# Patient Record
Sex: FEMALE | Race: WHITE | Employment: OTHER | URBAN - METROPOLITAN AREA
[De-identification: names, ages, dates, MRNs, and addresses within clinical notes are randomized per-mention and may not be internally consistent; named-entity substitution may affect disease eponyms.]

---

## 2017-03-03 ENCOUNTER — HOSPITAL ENCOUNTER (EMERGENCY)
Facility: CLINIC | Age: 47
Discharge: HOME | End: 2017-03-03
Attending: EMERGENCY MEDICINE | Admitting: EMERGENCY MEDICINE
Payer: MEDICARE

## 2017-03-03 ENCOUNTER — APPOINTMENT (OUTPATIENT)
Dept: CT IMAGING | Facility: CLINIC | Age: 47
End: 2017-03-03
Attending: EMERGENCY MEDICINE
Payer: MEDICARE

## 2017-03-03 VITALS
HEIGHT: 66 IN | BODY MASS INDEX: 21.53 KG/M2 | SYSTOLIC BLOOD PRESSURE: 120 MMHG | DIASTOLIC BLOOD PRESSURE: 84 MMHG | OXYGEN SATURATION: 96 % | WEIGHT: 134 LBS | RESPIRATION RATE: 19 BRPM | TEMPERATURE: 98.6 F

## 2017-03-03 DIAGNOSIS — R10.84 ABDOMINAL PAIN, GENERALIZED: ICD-10-CM

## 2017-03-03 LAB
ALBUMIN SERPL-MCNC: 3.9 G/DL (ref 3.4–5)
ALP SERPL-CCNC: 49 U/L (ref 40–150)
ALT SERPL W P-5'-P-CCNC: 20 U/L (ref 0–50)
ANION GAP SERPL CALCULATED.3IONS-SCNC: 8 MMOL/L (ref 3–14)
AST SERPL W P-5'-P-CCNC: 15 U/L (ref 0–45)
BILIRUB SERPL-MCNC: 0.4 MG/DL (ref 0.2–1.3)
BUN SERPL-MCNC: 9 MG/DL (ref 7–30)
CALCIUM SERPL-MCNC: 9 MG/DL (ref 8.5–10.1)
CHLORIDE SERPL-SCNC: 106 MMOL/L (ref 94–109)
CO2 SERPL-SCNC: 28 MMOL/L (ref 20–32)
CREAT SERPL-MCNC: 0.76 MG/DL (ref 0.52–1.04)
ERYTHROCYTE [DISTWIDTH] IN BLOOD BY AUTOMATED COUNT: 13.6 % (ref 10–15)
GFR SERPL CREATININE-BSD FRML MDRD: 82 ML/MIN/1.7M2
GLUCOSE SERPL-MCNC: 90 MG/DL (ref 70–99)
HCT VFR BLD AUTO: 42.1 % (ref 35–47)
HGB BLD-MCNC: 14 G/DL (ref 11.7–15.7)
LIPASE SERPL-CCNC: 220 U/L (ref 73–393)
MCH RBC QN AUTO: 28.4 PG (ref 26.5–33)
MCHC RBC AUTO-ENTMCNC: 33.3 G/DL (ref 31.5–36.5)
MCV RBC AUTO: 85 FL (ref 78–100)
PLATELET # BLD AUTO: 271 10E9/L (ref 150–450)
POTASSIUM SERPL-SCNC: 3.7 MMOL/L (ref 3.4–5.3)
PROT SERPL-MCNC: 6.9 G/DL (ref 6.8–8.8)
RBC # BLD AUTO: 4.93 10E12/L (ref 3.8–5.2)
SODIUM SERPL-SCNC: 142 MMOL/L (ref 133–144)
WBC # BLD AUTO: 5.6 10E9/L (ref 4–11)

## 2017-03-03 PROCEDURE — 83690 ASSAY OF LIPASE: CPT | Performed by: EMERGENCY MEDICINE

## 2017-03-03 PROCEDURE — 96361 HYDRATE IV INFUSION ADD-ON: CPT

## 2017-03-03 PROCEDURE — 25000132 ZZH RX MED GY IP 250 OP 250 PS 637: Performed by: EMERGENCY MEDICINE

## 2017-03-03 PROCEDURE — 80053 COMPREHEN METABOLIC PANEL: CPT | Performed by: EMERGENCY MEDICINE

## 2017-03-03 PROCEDURE — 74177 CT ABD & PELVIS W/CONTRAST: CPT

## 2017-03-03 PROCEDURE — 96374 THER/PROPH/DIAG INJ IV PUSH: CPT | Mod: 59

## 2017-03-03 PROCEDURE — 25000125 ZZHC RX 250: Performed by: EMERGENCY MEDICINE

## 2017-03-03 PROCEDURE — 96375 TX/PRO/DX INJ NEW DRUG ADDON: CPT

## 2017-03-03 PROCEDURE — 25500064 ZZH RX 255 OP 636: Performed by: EMERGENCY MEDICINE

## 2017-03-03 PROCEDURE — 25000128 H RX IP 250 OP 636: Performed by: EMERGENCY MEDICINE

## 2017-03-03 PROCEDURE — 99285 EMERGENCY DEPT VISIT HI MDM: CPT | Mod: 25

## 2017-03-03 PROCEDURE — 85027 COMPLETE CBC AUTOMATED: CPT | Performed by: EMERGENCY MEDICINE

## 2017-03-03 RX ORDER — ONDANSETRON 2 MG/ML
4 INJECTION INTRAMUSCULAR; INTRAVENOUS ONCE
Status: COMPLETED | OUTPATIENT
Start: 2017-03-03 | End: 2017-03-03

## 2017-03-03 RX ORDER — KETOROLAC TROMETHAMINE 15 MG/ML
15 INJECTION, SOLUTION INTRAMUSCULAR; INTRAVENOUS ONCE
Status: COMPLETED | OUTPATIENT
Start: 2017-03-03 | End: 2017-03-03

## 2017-03-03 RX ORDER — IOPAMIDOL 755 MG/ML
68 INJECTION, SOLUTION INTRAVASCULAR ONCE
Status: COMPLETED | OUTPATIENT
Start: 2017-03-03 | End: 2017-03-03

## 2017-03-03 RX ADMIN — SODIUM CHLORIDE 1000 ML: 9 INJECTION, SOLUTION INTRAVENOUS at 12:46

## 2017-03-03 RX ADMIN — SODIUM CHLORIDE 60 ML: 9 INJECTION, SOLUTION INTRAVENOUS at 14:38

## 2017-03-03 RX ADMIN — ONDANSETRON HYDROCHLORIDE 4 MG: 2 SOLUTION INTRAMUSCULAR; INTRAVENOUS at 12:47

## 2017-03-03 RX ADMIN — DOCUSATE SODIUM 286 ML: 50 LIQUID ORAL at 17:07

## 2017-03-03 RX ADMIN — KETOROLAC TROMETHAMINE 15 MG: 15 INJECTION, SOLUTION INTRAMUSCULAR; INTRAVENOUS at 14:09

## 2017-03-03 RX ADMIN — IOPAMIDOL 68 ML: 755 INJECTION, SOLUTION INTRAVENOUS at 14:38

## 2017-03-03 ASSESSMENT — ENCOUNTER SYMPTOMS
HEMATURIA: 0
NAUSEA: 1
ABDOMINAL DISTENTION: 1
CONSTIPATION: 1
ABDOMINAL PAIN: 1
FREQUENCY: 0
DYSURIA: 0

## 2017-03-03 NOTE — ED PROVIDER NOTES
"  History     Chief Complaint:  Abdominal Pain    HPI   Keyla Aragon is a 47 year old female with hx of RA who presents to the emergency department today for evaluation of abdominal pain. Pain has been ongoing for several weeks.The patient had a negative Cdiff test on 17. Of note, 15 days ago her CT scan at [facility] was read as normal but told that it was read abnormally and actually has stool impaction. Yesterday she reports having a abdominal x-ray that revealed she was \"full of stool.\" Her PCP then recommended the patient come into the ED today. The patient notes with the abdominal pain she is having constipation, only infrequent watery stools, and nausea. No vomiting but has been taking zofran.  She had been getting shots of Cosentyx with her 4th and most recent shot coming on 1/10/17. Since this shot, she has noticed irregular bowel movements. The patient has tried Imodium, Miralax, laxatives, and enemas for her constipation. She then has only had the watery stools and subsequent nausea for which she has used Zofran. Her pain is diffuse in the abdomen with some bloating but no dysuria, hematuria, frequency, or urgency. The patient has no history of bowel obstructions. She has a history of an appendectomy, cholecystectomy, hysterectomy, and .     Allergies:  Penicillins  Ceftin  Compazine  Phenergan  Reglan     Medications:    Ativan  Clonidine  Methotrexate  Miralax  Rituxan  Zofran  Folvite    Past Medical History:    Ankylosing spondylitis  Arthritis  Cholecystitis  Heart murmur  PONV  Spinal headaches     Past Surgical History:    C section  Discectomy lumbar posterior  Cholecystectomy  Appendectomy  Hysterectomy     Family History:    History reviewed. No pertinent family history.      Social History:  The patient was accompanied to the ED by .  Smoking Status: never smoker  Alcohol Use: negative   Marital Status:   [2]    Review of Systems   Gastrointestinal: Positive " "for abdominal distention, abdominal pain, constipation and nausea.   Genitourinary: Negative for dysuria, frequency, hematuria and urgency.   All other systems reviewed and are negative.    Physical Exam   Vitals:  Patient Vitals for the past 24 hrs:   BP Temp Temp src Heart Rate Resp SpO2 Height Weight   03/03/17 1600 123/77 - - - - - - -   03/03/17 1445 114/81 - - - - - - -   03/03/17 1026 133/80 98.6  F (37  C) Oral 83 19 98 % 1.676 m (5' 6\") 60.8 kg (134 lb)       Physical Exam    General: Resting comfortably on the gurney  Eyes:  The pupils are equal and round  ENT:    Moist mucous membranes    The oropharynx is clear  Neck:  Normal range of motion  CV:  Regular rate and rhythm    Skin warm and well perfused   Resp:  Lungs are clear    Non-labored    No rales    No wheezing   GI:  Abdomen is soft, there is no rigidity    No distension    No rebound tenderness     Minimal diffuse abdominal tenderness  MS:  Normal muscular tone  Skin:  No rash or acute skin lesions noted  Neuro:   Awake, alert.      Speech is normal and fluent.    Face is symmetric.     Moves all extremities  Psych:  Normal affect.  Appropriate interactions.     Emergency Department Course     Imaging:  Radiology findings were communicated with the patient who voiced understanding of the findings.    CT Abdomen Pelvis w Contrast   IMPRESSION: No acute changes in the abdomen or pelvis to account for   patient's symptoms. No bowel obstruction or diverticulitis. Appendix   not visualized. Abdominal and pelvic organs are within normal limits.   Prior cholecystectomy.      JOSE ALFREDO HUTCHISON MD     Laboratory:  Laboratory findings were communicated with the patient who voiced understanding of the findings.    CBC: WBC 5.6, HGB 14.0,   CMP: AWNL (Creatinine: 0.76)  Lipase: 220     Interventions:  1246 NS 1000 ml IV  1247 Zofran 4 mg IV  1324 Omnipaque 25 ml oral   1409 Toradol 15 mg IV   1707 Pink Lady Enema 286 ml Rectal      Emergency Department " Course:  Nursing notes and vitals reviewed.  I performed an exam of the patient as documented above.   IV was inserted and blood was drawn for laboratory testing, results above.  The patient was sent for imaging per above while in the emergency department, results above.    At 1620 the patient was rechecked on and was updated on the results of her laboratory and imaging studies.    1945 Patient re-evaluated  I discussed the treatment plan with the patient. They expressed understanding of this plan and consented to discharge. They will be discharged home with instructions for care and follow up. In addition, the patient will return to the emergency department if their symptoms persist, worsen, if new symptoms arise or if there is any concern.  All questions were answered.   I personally reviewed the laboratory and imaging results with the patient and answered all related questions prior to discharge.    Impression & Plan      Medical Decision Making:  Keyla Aragon is a 47 year old female who presents to the emergency department with abdominal pain. Vital signs are normal. She has an unremarkable physical exam and minimal abdominal tenderness on exam. She is concerned about a bowel obstruction. Laboratory values obtained and CBC, CMP, and lipase are normal. She has had a hysterectomy before. Discussed with her about her symptoms today as well as laboratory and CT findings on previous visits. CT abdomen performed today and unremarkable. Unable to visualize appendix but patient has had previous appendectomy. She already has an appointment with [facility] for pain and has a gastric emptying study in a few days. No evidence of dehydration on evaluation or laboratory values. Patient was given pink lady enema for constipation and recommended bowel regime at home. Recommend follow up with [facility] and the gastric emptying study. No evidence of infection on CT and she recently had a negative Cdiff study. Wondering if this could be  gastroparesis versus food allergy versus constipation versus chronic pain after being on narcotics for many years. Has appropriate follow-up and no indication for hospitalization. Reasons to return to the ED were discussed with the patient. Recommended follow up with appointments she already has scheduled.     Diagnosis:    ICD-10-CM    1. Abdominal pain, generalized R10.84      Disposition:   Discharge    Scribe Disclosure:  INato, am serving as a scribe at 11:26 AM on 3/3/2017 to document services personally performed by Maria T Malave MD, based on my observations and the provider's statements to me.   3/3/2017    EMERGENCY DEPARTMENT       Maria T Malave MD  03/03/17 1816

## 2017-03-03 NOTE — ED AVS SNAPSHOT
Emergency Department    [address] [facility] [address]    Phone:  [phone]    Fax:  [phone]                                       Keyla Aragon   MRN: [id number]    Department:   Emergency Department   Date of Visit:  3/3/2017           After Visit Summary Signature Page     I have received my discharge instructions, and my questions have been answered. I have discussed any challenges I see with this plan with the nurse or doctor.    ..........................................................................................................................................  Patient/Patient Representative Signature      ..........................................................................................................................................  Patient Representative Print Name and Relationship to Patient    ..................................................               ................................................  Date                                            Time    ..........................................................................................................................................  Reviewed by Signature/Title    ...................................................              ..............................................  Date                                                            Time

## 2017-03-03 NOTE — ED AVS SNAPSHOT
Emergency Department    [address] [facility] [address]    Phone:  [phone]    Fax:  [phone]                                       Keyla Aragon   MRN: [id number]    Department:   Emergency Department   Date of Visit:  3/3/2017           Patient Information     Date Of Birth          1970        Your diagnoses for this visit were:     Abdominal pain, generalized        You were seen by Maria T Malave MD.      Follow-up Information     Follow up with Danilo Sánchez MD.    Specialty:  Family Practice    Contact information:    [facility]  [address] [facility] [address]  [facility] [address]  [phone]          Follow up with  Emergency Department.    Specialty:  EMERGENCY MEDICINE    Why:  If symptoms worsen    Contact information:    [address] [facility] [address] [phone]        Discharge Instructions       Follow up with [facility] next week  Go to your gastric emptying study on Monday  Use miralax twice per day. Can also do docusate twice per day, senna twice per day until having soft bowel movements daily then can back off     Discharge Instructions  Abdominal Pain    Abdominal pain can be caused by many things. Your evaluation today does not show the exact cause for your pain. Your doctor today has decided that it is unlikely your pain is due to a life threatening problem, or a problem requiring surgery or hospital admission. Sometimes those problems cannot be found right away, so it is very important that you follow up as directed.  Sometimes only the changes which occur over time allow the cause of your pain to be found.    Return to the Emergency Department for a recheck in 8-12 hours if your pain continues.  If your pain gets worse, changes in location, or feels different, return to the Emergency Department right away.    ADULTS:  Return to the Emergency Department right away if:      You get an oral temperature above 102oF or as  directed by your doctor.    You have blood in your stools (bright red or black, tarry stools).    You keep throwing up or can t drink liquids.    You see blood when you throw up.    You can t have a bowel movement or you can t pass gas.    Your stomach gets bloated or bigger.    Your skin or the whites of your eyes look yellow.    You faint.    You have bloody, frequent or painful urination.    You have new symptoms or anything that worries you.    CHILDREN:  Return to the Emergency Department right away if your child has any of the above-listed symptoms or the following:      Pushes your hand away or screams/cries when his/her belly is touched.    You notice your child is very fussy or weak.    Your child is very tired and is too tired to eat or drink.    Your child is dehydrated.  Signs of dehydration can be:  o Your infant has had no wet diapers in 4-5 hours.  o Your older child has not passed urine in 6-8 hours.  o Your infant or child starts to have dry mouth and lips, or no saliva or tears.    PREGNANT WOMEN:  Return to the Emergency Department right away if you have any of the above-listed symptoms or the following:      You have bleeding, leaking fluid or passing tissue from the vagina.    You have worse pain or cramping, or pain in your shoulder or back.    You have vomiting that will not stop.    You have painful or bloody urination.    You have a temperature of 100oF or more.    Your baby is not moving as much as usual.    You faint.    You get a bad headache with or without eye problems and abdominal pain.    You have a convulsion or seizure.    You have unusual discharge from your vagina and abdominal pain.    Abdominal pain is pretty common during pregnancy.  Your pain may or may not be related to your pregnancy. You should follow-up closely with your OB doctor so they can evaluate you and your baby.  Until you follow-up with your regular doctor, do the following:       Avoid sex and do not put  "anything in your vagina.    Drink clear fluids.    Only take medications approved by your doctor.    MORE INFORMATION:    Appendicitis:  A possible cause of abdominal pain in any person who still has their appendix is acute appendicitis. Appendicitis is often hard to diagnose.  Testing does not always rule out early appendicitis or other causes of abdominal pain. Close follow-up with your doctor and re-evaluations may be needed to figure out the reason for your abdominal pain.    Follow-up:  It is very important that you make an appointment with your clinic and go to the appointment.  If you do not follow-up with your primary doctor, it may result in missing an important development which could result in permanent injury or disability and/or lasting pain.  If there is any problem keeping your appointment, call your doctor or return to the Emergency Department.    Medications:  Take your medications as directed by your doctor today.  Before using over-the-counter medications, ask your doctor and make sure to take the medications as directed.  If you have any questions about medications, ask your doctor.    Diet:  Resume your normal diet as much as possible, but do not eat fried, fatty or spicy foods while you have pain.  Do not drink alcohol or have caffeine.  Do not smoke tobacco.    Probiotics: If you have been given an antibiotic, you may want to also take a probiotic pill or eat yogurt with live cultures. Probiotics have \"good bacteria\" to help your intestines stay healthy. Studies have shown that probiotics help prevent diarrhea and other intestine problems (including C. diff infection) when you take antibiotics. You can buy these without a prescription in the pharmacy section of the store.     If you were given a prescription for medicine here today, be sure to read all of the information (including the package insert) that comes with your prescription.  This will include important information about the " medicine, its side effects, and any warnings that you need to know about.  The pharmacist who fills the prescription can provide more information and answer questions you may have about the medicine.  If you have questions or concerns that the pharmacist cannot address, please call or return to the Emergency Department.     Remember that you can always come back to the Emergency Department if you are not able to see your regular doctor in the amount of time listed above, if you get any new symptoms, or if there is anything that worries you.          24 Hour Appointment Hotline       To make an appointment at any [facility], call [phone] ([phone]). If you don't have a family doctor or clinic, we will help you find one. [facility] clinics are conveniently located to serve the needs of you and your family.             Review of your medicines      Our records show that you are taking the medicines listed below. If these are incorrect, please call your family doctor or clinic.        Dose / Directions Last dose taken    ATIVAN PO   Dose:  0.5 mg        Take 0.5 mg by mouth   Refills:  0        CLONIDINE HCL PO        Refills:  0        folic acid 1 MG tablet   Commonly known as:  FOLVITE   Dose:  1 mg        Take 1 mg by mouth daily.   Refills:  0        METHOTREXATE PO        Refills:  0        NONFORMULARY        Refills:  0        polyethylene glycol powder   Commonly known as:  MIRALAX/GLYCOLAX   Dose:  1 capful        Take 1 capful by mouth daily   Refills:  0        RITUXAN IV        Inject  into the vein every 6 months.   Refills:  0        ZOFRAN PO   Dose:  8 mg        Take 8 mg by mouth   Refills:  0                Procedures and tests performed during your visit     CBC (+ platelets, no diff)    CT Abdomen Pelvis w Contrast    Comprehensive metabolic panel    Lipase      Orders Needing Specimen Collection     None      Pending Results     No orders found from 3/1/2017 to 3/4/2017.             Pending Culture Results     No orders found from 3/1/2017 to 3/4/2017.             Test Results from your hospital stay     3/3/2017  1:04 PM - Interface, Flexilab Results      Component Results     Component Value Ref Range & Units Status    Sodium 142 133 - 144 mmol/L Final    Potassium 3.7 3.4 - 5.3 mmol/L Final    Chloride 106 94 - 109 mmol/L Final    Carbon Dioxide 28 20 - 32 mmol/L Final    Anion Gap 8 3 - 14 mmol/L Final    Glucose 90 70 - 99 mg/dL Final    Urea Nitrogen 9 7 - 30 mg/dL Final    Creatinine 0.76 0.52 - 1.04 mg/dL Final    GFR Estimate 82 >60 mL/min/1.7m2 Final    Non  GFR Calc    GFR Estimate If Black >90   GFR Calc   >60 mL/min/1.7m2 Final    Calcium 9.0 8.5 - 10.1 mg/dL Final    Bilirubin Total 0.4 0.2 - 1.3 mg/dL Final    Albumin 3.9 3.4 - 5.0 g/dL Final    Protein Total 6.9 6.8 - 8.8 g/dL Final    Alkaline Phosphatase 49 40 - 150 U/L Final    ALT 20 0 - 50 U/L Final    AST 15 0 - 45 U/L Final         3/3/2017 12:47 PM - Interface, Flexilab Results      Component Results     Component Value Ref Range & Units Status    WBC 5.6 4.0 - 11.0 10e9/L Final    RBC Count 4.93 3.8 - 5.2 10e12/L Final    Hemoglobin 14.0 11.7 - 15.7 g/dL Final    Hematocrit 42.1 35.0 - 47.0 % Final    MCV 85 78 - 100 fl Final    MCH 28.4 26.5 - 33.0 pg Final    MCHC 33.3 31.5 - 36.5 g/dL Final    RDW 13.6 10.0 - 15.0 % Final    Platelet Count 271 150 - 450 10e9/L Final         3/3/2017  1:02 PM - Interface, Flexilab Results      Component Results     Component Value Ref Range & Units Status    Lipase 220 73 - 393 U/L Final         3/3/2017  3:06 PM - Interface, Radiant Ib      Narrative     CT ABDOMEN AND PELVIS WITH CONTRAST 3/3/2017 2:40 PM     HISTORY: Diffuse abdominal pain.     TECHNIQUE: Axial images from the lung bases to the symphysis are  performed with additional coronal reformatted images. 68 mL of Isovue  370 are given intravenously.  Radiation dose for this scan was  reduced  using automated exposure control, adjustment of the mA and/or kV  according to patient size, or iterative reconstruction technique. Oral  contrast is also given.    FINDINGS: The lung bases are clear.    Abdomen: Gallbladder is not identified and is likely surgically  absent. The liver, spleen, pancreas, adrenal glands and kidneys are  unremarkable. No hydronephrosis. No evidence of urinary tract calculi.  The bowel is unremarkable. No obstruction or diverticulitis. Appendix  is not identified. No enlarged abdominal lymph nodes.    Pelvis: The bladder and rectum are unremarkable. No enlarged pelvic  lymph nodes or free fluid. Bone window examination is unremarkable. No  aggressive- appearing bone lesions are evident.        Impression     IMPRESSION: No acute changes in the abdomen or pelvis to account for  patient's symptoms. No bowel obstruction or diverticulitis. Appendix  not visualized. Abdominal and pelvic organs are within normal limits.  Prior cholecystectomy.    JOSE ALFREDO HUTCHISON MD                Clinical Quality Measure: Blood Pressure Screening     Your blood pressure was checked while you were in the emergency department today. The last reading we obtained was  BP: 120/84 . Please read the guidelines below about what these numbers mean and what you should do about them.  If your systolic blood pressure (the top number) is less than 120 and your diastolic blood pressure (the bottom number) is less than 80, then your blood pressure is normal. There is nothing more that you need to do about it.  If your systolic blood pressure (the top number) is 120-139 or your diastolic blood pressure (the bottom number) is 80-89, your blood pressure may be higher than it should be. You should have your blood pressure rechecked within a year by a primary care provider.  If your systolic blood pressure (the top number) is 140 or greater or your diastolic blood pressure (the bottom number) is 90 or greater, you may have  "high blood pressure. High blood pressure is treatable, but if left untreated over time it can put you at risk for heart attack, stroke, or kidney failure. You should have your blood pressure rechecked by a primary care provider within the next 4 weeks.  If your provider in the emergency department today gave you specific instructions to follow-up with your doctor or provider even sooner than that, you should follow that instruction and not wait for up to 4 weeks for your follow-up visit.        Thank you for choosing [facility]       Thank you for choosing [facility] for your care. Our goal is always to provide you with excellent care. Hearing back from our patients is one way we can continue to improve our services. Please take a few minutes to complete the written survey that you may receive in the mail after you visit with us. Thank you!        [employer] Information     [employer] lets you send messages to your doctor, view your test results, renew your prescriptions, schedule appointments and more. To sign up, go to [email or web address] . Click on \"Log in\" on the left side of the screen, which will take you to the Welcome page. Then click on \"Sign up Now\" on the right side of the page.     You will be asked to enter the access code listed below, as well as some personal information. Please follow the directions to create your username and password.     Your access code is: [email or web address]  Expires: 2017  6:18 PM     Your access code will  in 90 days. If you need help or a new code, please call your [facility] clinic or [phone].        Care EveryWhere ID     This is your Care EveryWhere ID. This could be used by other organizations to access your [facility] medical records  [id number]        After Visit Summary       This is your record. Keep this with you and show to your community pharmacist(s) and doctor(s) at your next visit.                  "

## 2017-03-04 NOTE — DISCHARGE INSTRUCTIONS
Follow up with [facility] next week  Go to your gastric emptying study on Monday  Use miralax twice per day. Can also do docusate twice per day, senna twice per day until having soft bowel movements daily then can back off     Discharge Instructions  Abdominal Pain    Abdominal pain can be caused by many things. Your evaluation today does not show the exact cause for your pain. Your doctor today has decided that it is unlikely your pain is due to a life threatening problem, or a problem requiring surgery or hospital admission. Sometimes those problems cannot be found right away, so it is very important that you follow up as directed.  Sometimes only the changes which occur over time allow the cause of your pain to be found.    Return to the Emergency Department for a recheck in 8-12 hours if your pain continues.  If your pain gets worse, changes in location, or feels different, return to the Emergency Department right away.    ADULTS:  Return to the Emergency Department right away if:      You get an oral temperature above 102oF or as directed by your doctor.    You have blood in your stools (bright red or black, tarry stools).    You keep throwing up or can t drink liquids.    You see blood when you throw up.    You can t have a bowel movement or you can t pass gas.    Your stomach gets bloated or bigger.    Your skin or the whites of your eyes look yellow.    You faint.    You have bloody, frequent or painful urination.    You have new symptoms or anything that worries you.    CHILDREN:  Return to the Emergency Department right away if your child has any of the above-listed symptoms or the following:      Pushes your hand away or screams/cries when his/her belly is touched.    You notice your child is very fussy or weak.    Your child is very tired and is too tired to eat or drink.    Your child is dehydrated.  Signs of dehydration can be:  o Your infant has had no wet diapers in 4-5 hours.  o Your older child has not  passed urine in 6-8 hours.  o Your infant or child starts to have dry mouth and lips, or no saliva or tears.    PREGNANT WOMEN:  Return to the Emergency Department right away if you have any of the above-listed symptoms or the following:      You have bleeding, leaking fluid or passing tissue from the vagina.    You have worse pain or cramping, or pain in your shoulder or back.    You have vomiting that will not stop.    You have painful or bloody urination.    You have a temperature of 100oF or more.    Your baby is not moving as much as usual.    You faint.    You get a bad headache with or without eye problems and abdominal pain.    You have a convulsion or seizure.    You have unusual discharge from your vagina and abdominal pain.    Abdominal pain is pretty common during pregnancy.  Your pain may or may not be related to your pregnancy. You should follow-up closely with your OB doctor so they can evaluate you and your baby.  Until you follow-up with your regular doctor, do the following:       Avoid sex and do not put anything in your vagina.    Drink clear fluids.    Only take medications approved by your doctor.    MORE INFORMATION:    Appendicitis:  A possible cause of abdominal pain in any person who still has their appendix is acute appendicitis. Appendicitis is often hard to diagnose.  Testing does not always rule out early appendicitis or other causes of abdominal pain. Close follow-up with your doctor and re-evaluations may be needed to figure out the reason for your abdominal pain.    Follow-up:  It is very important that you make an appointment with your clinic and go to the appointment.  If you do not follow-up with your primary doctor, it may result in missing an important development which could result in permanent injury or disability and/or lasting pain.  If there is any problem keeping your appointment, call your doctor or return to the Emergency Department.    Medications:  Take your  "medications as directed by your doctor today.  Before using over-the-counter medications, ask your doctor and make sure to take the medications as directed.  If you have any questions about medications, ask your doctor.    Diet:  Resume your normal diet as much as possible, but do not eat fried, fatty or spicy foods while you have pain.  Do not drink alcohol or have caffeine.  Do not smoke tobacco.    Probiotics: If you have been given an antibiotic, you may want to also take a probiotic pill or eat yogurt with live cultures. Probiotics have \"good bacteria\" to help your intestines stay healthy. Studies have shown that probiotics help prevent diarrhea and other intestine problems (including C. diff infection) when you take antibiotics. You can buy these without a prescription in the pharmacy section of the store.     If you were given a prescription for medicine here today, be sure to read all of the information (including the package insert) that comes with your prescription.  This will include important information about the medicine, its side effects, and any warnings that you need to know about.  The pharmacist who fills the prescription can provide more information and answer questions you may have about the medicine.  If you have questions or concerns that the pharmacist cannot address, please call or return to the Emergency Department.     Remember that you can always come back to the Emergency Department if you are not able to see your regular doctor in the amount of time listed above, if you get any new symptoms, or if there is anything that worries you.        "

## 2018-12-17 ENCOUNTER — TELEPHONE (OUTPATIENT)
Dept: OPHTHALMOLOGY | Facility: CLINIC | Age: 48
End: 2018-12-17

## 2018-12-21 ENCOUNTER — TELEPHONE (OUTPATIENT)
Dept: OPHTHALMOLOGY | Facility: CLINIC | Age: 48
End: 2018-12-21

## 2019-01-03 ENCOUNTER — TELEPHONE (OUTPATIENT)
Dept: OPHTHALMOLOGY | Facility: CLINIC | Age: 49
End: 2019-01-03

## 2019-01-03 NOTE — TELEPHONE ENCOUNTER
FUTURE VISIT INFORMATION      FUTURE VISIT INFORMATION:    Date: 01/07/19    Time: 145pm    Location: [facility]  REFERRAL INFORMATION:    Referring provider:  YUNIOR DALE    Referring providers clinic:  [facility]/ verbal    Reason for visit/diagnosis  Drooping Eyelids on both sides.

## 2019-01-07 ENCOUNTER — OFFICE VISIT (OUTPATIENT)
Dept: OPHTHALMOLOGY | Facility: CLINIC | Age: 49
End: 2019-01-07
Payer: COMMERCIAL

## 2019-01-07 ENCOUNTER — TRANSFERRED RECORDS (OUTPATIENT)
Dept: HEALTH INFORMATION MANAGEMENT | Facility: CLINIC | Age: 49
End: 2019-01-07

## 2019-01-07 ENCOUNTER — DOCUMENTATION ONLY (OUTPATIENT)
Dept: OPHTHALMOLOGY | Facility: CLINIC | Age: 49
End: 2019-01-07

## 2019-01-07 ENCOUNTER — PRE VISIT (OUTPATIENT)
Dept: OPHTHALMOLOGY | Facility: CLINIC | Age: 49
End: 2019-01-07

## 2019-01-07 DIAGNOSIS — H57.813 BROW PTOSIS, BILATERAL: ICD-10-CM

## 2019-01-07 DIAGNOSIS — H02.401 PTOSIS OF EYELID, RIGHT: Primary | ICD-10-CM

## 2019-01-07 ASSESSMENT — TONOMETRY
OS_IOP_MMHG: 13
IOP_METHOD: ICARE
OD_IOP_MMHG: 13

## 2019-01-07 ASSESSMENT — LEVATOR FUNCTION
OS_LEVATOR: 15
OD_LEVATOR: 15

## 2019-01-07 ASSESSMENT — MARGIN REFLEX DISTANCE
OS_MRD1: 3
OD_MRD1: 2

## 2019-01-07 ASSESSMENT — VISUAL ACUITY
METHOD: SNELLEN - LINEAR
OD_SC: 20/20
OS_SC: 20/20

## 2019-01-07 ASSESSMENT — SLIT LAMP EXAM - LIDS: COMMENTS: NORMAL

## 2019-01-07 ASSESSMENT — CONF VISUAL FIELD
METHOD: COUNTING FINGERS
OS_NORMAL: 1
OD_NORMAL: 1

## 2019-01-07 NOTE — PROGRESS NOTES
Met with patient to schedule surgery with Dr. Nato Bradley..    Surgery was scheduled on 02/20 at [facility].    Patient will have H&P at [facility]  Post-Op care appointment was scheduled on 03/04  Patient is aware a / is needed day of surgery.   Patient received surgery packet has my direct contact information for any further questions.     Sara please contact patient with cosmetic quote

## 2019-01-07 NOTE — PATIENT INSTRUCTIONS
"Ptosis (Drooping Eyelids)    Eyelid ptosis (pronounced \"martir-sis\") is a condition in which the upper eyelid droops or sags. It can affect one or both eyes. Sometimes the eyelid droops enough to obstruct the upper field of vision and/or side vision, requiring correction.??Ptosis Repair is a surgical procedure that can correct drooping eyelid(s). Depending upon the degree and cause, repair involves either resection (shortening) of a muscle in the eyelid or suspension with a muscle of the brow. Typically, the levator muscle (the major muscle responsible for elevating the upper eyelid) is shortened though an incision made along the natural crease of the lid. Excess skin weighing down the eyelid may also be removed.     Congenital Ptosis  Present from birth, the most common cause of congenital ptosis is the improper development of the levator muscle. Children may need tilt their head back or lift their eyelid with a finger to see. They may also develop amblyopia (\"lazy eye\"), strabismus (eyes that are not properly aligned), astigmatism, or blurred vision. Repair for mild to moderate congenital ptosis is generally performed between ages 3 and 5. Severe visual obstruction may require earlier treatment. ??Repair is usually performed in an outpatient surgical facility under general anesthesia so the child will not become anxious or restless during the procedure.     Acquired Ptosis  Most commonly due to age-related weakening of the levator muscle, acquired ptosis may also be caused by injury, trauma, or procedures, such as cataract surgery, which can cause weak tendons to stretch. Acquired ptosis may also be the first sign of some diseases, such as myasthenia gravis (a disorder in which the muscles become weak), or Isabel's syndrome (a neurological condition that indicates injury to part of the sympathetic nervous system).? Ptosis Repair is usually performed in an outpatient surgical facility under anesthesia that induces a " "\"twilight\" state. Sedated consciousness is preferred so that Dr. Bradley can accurately adjust the eyelids.     Who Should Perform The Surgery?   When choosing a surgeon to perform ptosis surgery, look for a cosmetic and reconstructive surgeon who specializes in the eyelids, orbit, and tear drain system. Dr. Bradley's membership in the American Society of Ophthalmic Plastic and Reconstructive Surgery (ASOPRS) indicates he or she is not only a board certified ophthalmologist who knows the anatomy and structure of the eyelids and orbit, but also has had extensive training in ophthalmic plastic reconstructive and cosmetic surgery.    EYEBROW AND FOREHEAD  Eyebrow and Forehead Lifting addresses eyebrow position and loose or wrinkled forehead skin and underlying tissue. To fully understand the benefits of eyebrow and forehead lifting, one must be aware of the importance of the position of the eyebrows.  Eyebrow position changes as we age.      Our natural eyebrow position, the effects of gravity and fat deflation, how active our eyebrow and forehead muscles are, and previous eyelid, eyebrow or forehead surgeries   (if applicable), all contribute to the position our eyebrows are in today. The face sends a message, and the position of the eyebrows is a significant part of that message.   That message could be, \"I feel worried,   I feel angry,  or  I feel stressed.  The message could also be,  I feel calm, rested, and relaxed.  There are several types of eyebrow and   forehead lifts. The type you and your surgeon choose will depend on your current eyebrow position, facial structure, and on what is possible to maximize your appearance. The main types of forehead lifting are as follows:     Endoscopic Forehead Lift      The Endoscopic Eyebrow/ Forehead Lift is very popular. It requires 5 small incisions hidden in the hair and leaves no visible scar. This procedure can lift everything from the hairline to the eyebrows. It " tightens and smoothes the entire forehead while lifting the eyebrow area which  opens up  the entire upper face. Eyebrow shape and asymmetry can be addressed with this type of lift. Following the procedure there will be some bruising and swelling. Patients are usually comfortable returning to their normal routine activities in about 2 weeks.      The Pretrichial Eyebrow/ Forehead      The Pretrichial Eyebrow/ Forehead Lift requires a long incision. This procedure is ideal for patients who want to both lift the eyebrows and raise and shorten the forehead by removing a strip of skin and underlying tissue along the incision. Because the forehead is shortened, the hairline is lowered which is ideal for those with a high forehead. This procedure lifts everything from the hairline down to the eyebrows and can address eyebrow shape and asymmetry. The scar from the incision, once healed, is virtually undetectable. Following the procedure there will be some bruising and swelling. Patients usually return to their normal daily routine in 2 - 3 weeks      The Direct Brow Lift      The Direct Brow Lift requires removing a section of skin and underlying tissue above and following the length of the eyebrows. This procedure is ideal for those who do not want to involve the hairline. This procedure also addresses eyebrow shape and asymmetry. Care is taken to position the scar just along the eyebrows so that it is maximally camouflaged as shown below. Patients will experience some bruising and swelling following the procedure. They are usually comfortable returning to their normal routine activities in about 2 weeks.     The Small-Incision and Transblepharoplasty Browpexy    The small incision browpexy involves a small incision (about   inch) placed in the brow hairs.  The transblepharoblasty technique uses the upper blepharoplasty incision. These techniques can be used to minimally lift and stabilize the tail of the eyebrow. These  techniques are ideal for patients with minimal brow drooping and excess skin in the lateral eyelid.  Most patients are able to return to normal activities within one week.     Who Should Perform The Eyebrow and Forehead Lift?      When choosing a surgeon to perform an Eyebrow/ Forehead Lift, look for a cosmetic and reconstructive surgeon who specializes in the eyelids, orbit, and the adjacent structures such as the eyebrows, forehead, cheeks and midface. Dr. Bradley s membership in the American Society of Ophthalmic Plastic and Reconstructive Surgery (ASOPRS) indicates he is not only a board certified ophthalmologist who knows the anatomy and structure of the eyelids and orbit, but also has had extensive training in ophthalmic plastic reconstructive and cosmetic surgery.

## 2019-01-07 NOTE — LETTER
2019         RE:  :  MRN: Keyla Aragon  1970  [id number]     Dear Dr. Jenny Murray,    Thank you for asking me to see your patient, Keyla Aragon, for an oculoplastic   consultation.  My assessment and plan are below.  For further details, please see my attached clinic note.      1. Ptosis of eyelid, right    2. Brow ptosis, bilateral      Drooping of RUL, hx of Botox injections (last 10/2018)  - saw Plastic surgeon, referred to Oculoplastics  - she notes restricted peripheral vision  - +Rheumatoid Arthritis (s/p Rituxan, Prednisone)  - Takes Excedrin PRN    PLAN:  RIGHT upper lids ptosis repair   BILATERAL brow ptosis repair        Again, thank you for allowing me to participate in the care of your patient.      Sincerely,    Nato Bradley MD  Department of Ophthalmology and Visual Neurosciences  [facility]    CC: Danilo Sánchez MD  [facility]  [address]  [facility] [address]  VIA Facsimile: [phone]     Jenny Murray MD  [facility]  [address]  [place] [address]  VIA In Basket

## 2019-01-07 NOTE — PROGRESS NOTES
1. Ptosis of eyelid, right    2. Brow ptosis, bilateral        FUNCTIONAL COMPLAINTS RELATED TO DROOPY EYELIDS/BROWS:  Keyla Aragon describes right upper lid interfering with superior visual field and interfering with activities of daily living including reading, driving and watching television.     Drooping of RUL, hx of Botox injections (last 10/2018)  - saw Plastic surgeon, referred to Oculoplastics  - she notes restricted peripheral vision  - +Rheumatoid Arthritis (s/p Rituxan, Prednisone)    - Takes Excedrin PRN    EXAM:   Dominant eye Left    MRD1: Right eye 2mm   Left eye 3.5mm  Brow ptosis, RIGHT     VISUAL FIELD:  Right eye untaped: 22 degrees Right eye taped: 52 degrees  Right eye visual field improves by: 30 degrees    PLAN:  RIGHT upper lids ptosis repair (MMCR 8mm)  BILATERAL brow ptosis repair (Temporal)- Cosmetic      Malik Anderson MD, FLAVIO  Oculofacial Plastics and Orbit Surgery Fellow    Attending Physician Attestation:  Complete documentation of historical and exam elements from today's encounter can be found in the full encounter summary report (not reduplicated in this progress note).  I personally obtained the chief complaint(s) and history of present illness.  I confirmed and edited as necessary the review of systems, past medical/surgical history, family history, social history, and examination findings as documented by others; and I examined the patient myself.  I personally reviewed the relevant tests, images, and reports as documented above.  I formulated and edited as necessary the assessment and plan and discussed the findings and management plan with the patient and family. I personally reviewed the ophthalmic test(s) associated with this encounter, agree with the interpretation(s) as documented by the resident/fellow, and have edited the corresponding report(s) as necessary.   -Nato Bradley MD    Today with Keyla Aragon  And her , I reviewed the indications,  risks, benefits, and alternatives of the proposed surgical procedure including, but not limited to, failure obtain the desired result  and need for additional surgery, bleeding, infection, loss of vision, loss of the eye, and the remote possibility of permanent damage to any organ system or death with the use of anesthesia.  I provided multiple opportunities for the questions, answered all questions to the best of my ability, and confirmed that my answers and my discussion were understood.   - Nato Bradley MD 2:38 PM 1/7/2019

## 2019-01-07 NOTE — NURSING NOTE
Chief Complaints and History of Present Illnesses   Patient presents with     Droopy Eye Lid Evaluation     Chief Complaint(s) and History of Present Illness(es)     Droopy Eye Lid Evaluation     Laterality: right upper lid    Onset: new    Associated signs and symptoms: Negative for lid swelling    Response to treatment: mild improvement    Pain scale: 0/10              Comments     Pt states had been having cosmetic botox injections for the last year, pt states last injection 10/2018 and noticed that the right upperlid started to droop. No pain. Has noticed slight increase.     Kae Linares COT 1:34 PM January 7, 2019

## 2019-02-19 ENCOUNTER — ANESTHESIA EVENT (OUTPATIENT)
Dept: SURGERY | Facility: AMBULATORY SURGERY CENTER | Age: 49
End: 2019-02-19

## 2019-02-20 ENCOUNTER — ANESTHESIA (OUTPATIENT)
Dept: SURGERY | Facility: AMBULATORY SURGERY CENTER | Age: 49
End: 2019-02-20

## 2019-02-20 ENCOUNTER — HOSPITAL ENCOUNTER (OUTPATIENT)
Facility: AMBULATORY SURGERY CENTER | Age: 49
End: 2019-02-20
Attending: OPHTHALMOLOGY
Payer: COMMERCIAL

## 2019-02-20 VITALS
WEIGHT: 138 LBS | OXYGEN SATURATION: 95 % | BODY MASS INDEX: 22.18 KG/M2 | HEART RATE: 84 BPM | DIASTOLIC BLOOD PRESSURE: 62 MMHG | SYSTOLIC BLOOD PRESSURE: 101 MMHG | RESPIRATION RATE: 18 BRPM | TEMPERATURE: 97.2 F | HEIGHT: 66 IN

## 2019-02-20 DIAGNOSIS — Z98.890 POSTOPERATIVE EYE STATE: Primary | ICD-10-CM

## 2019-02-20 RX ORDER — PROPOFOL 10 MG/ML
INJECTION, EMULSION INTRAVENOUS CONTINUOUS PRN
Status: DISCONTINUED | OUTPATIENT
Start: 2019-02-20 | End: 2019-02-20

## 2019-02-20 RX ORDER — TRAMADOL HYDROCHLORIDE 50 MG/1
50 TABLET ORAL EVERY 6 HOURS PRN
Qty: 10 TABLET | Refills: 0 | Status: SHIPPED | OUTPATIENT
Start: 2019-02-20 | End: 2019-02-20

## 2019-02-20 RX ORDER — NEOMYCIN POLYMYXIN B SULFATES AND DEXAMETHASONE 3.5; 10000; 1 MG/ML; [USP'U]/ML; MG/ML
1 SUSPENSION/ DROPS OPHTHALMIC 4 TIMES DAILY
Qty: 5 ML | Refills: 0 | Status: SHIPPED | OUTPATIENT
Start: 2019-02-20 | End: 2020-08-22

## 2019-02-20 RX ORDER — HYDROCODONE BITARTRATE AND ACETAMINOPHEN 5; 325 MG/1; MG/1
1 TABLET ORAL EVERY 6 HOURS PRN
Qty: 10 TABLET | Refills: 0 | Status: SHIPPED | OUTPATIENT
Start: 2019-02-20 | End: 2019-02-20

## 2019-02-20 RX ORDER — ACETAMINOPHEN 325 MG/1
975 TABLET ORAL ONCE
Status: COMPLETED | OUTPATIENT
Start: 2019-02-20 | End: 2019-02-20

## 2019-02-20 RX ORDER — KETAMINE HYDROCHLORIDE 10 MG/ML
INJECTION, SOLUTION INTRAMUSCULAR; INTRAVENOUS PRN
Status: DISCONTINUED | OUTPATIENT
Start: 2019-02-20 | End: 2019-02-20

## 2019-02-20 RX ORDER — CLINDAMYCIN HCL 150 MG
150 CAPSULE ORAL 2 TIMES DAILY
Qty: 10 CAPSULE | Refills: 0 | Status: SHIPPED | OUTPATIENT
Start: 2019-02-20 | End: 2019-04-22

## 2019-02-20 RX ORDER — OXYCODONE HYDROCHLORIDE 5 MG/1
5 TABLET ORAL EVERY 4 HOURS PRN
Status: DISCONTINUED | OUTPATIENT
Start: 2019-02-20 | End: 2019-02-21 | Stop reason: HOSPADM

## 2019-02-20 RX ORDER — SCOLOPAMINE TRANSDERMAL SYSTEM 1 MG/1
1 PATCH, EXTENDED RELEASE TRANSDERMAL
Status: DISCONTINUED | OUTPATIENT
Start: 2019-02-20 | End: 2019-02-21 | Stop reason: HOSPADM

## 2019-02-20 RX ORDER — ONDANSETRON 2 MG/ML
4 INJECTION INTRAMUSCULAR; INTRAVENOUS EVERY 30 MIN PRN
Status: DISCONTINUED | OUTPATIENT
Start: 2019-02-20 | End: 2019-02-21 | Stop reason: HOSPADM

## 2019-02-20 RX ORDER — ONDANSETRON 2 MG/ML
INJECTION INTRAMUSCULAR; INTRAVENOUS PRN
Status: DISCONTINUED | OUTPATIENT
Start: 2019-02-20 | End: 2019-02-20

## 2019-02-20 RX ORDER — NALOXONE HYDROCHLORIDE 0.4 MG/ML
.1-.4 INJECTION, SOLUTION INTRAMUSCULAR; INTRAVENOUS; SUBCUTANEOUS
Status: DISCONTINUED | OUTPATIENT
Start: 2019-02-20 | End: 2019-02-21 | Stop reason: HOSPADM

## 2019-02-20 RX ORDER — LIDOCAINE 40 MG/G
CREAM TOPICAL
Status: DISCONTINUED | OUTPATIENT
Start: 2019-02-20 | End: 2019-02-20 | Stop reason: HOSPADM

## 2019-02-20 RX ORDER — MEPERIDINE HYDROCHLORIDE 25 MG/ML
12.5 INJECTION INTRAMUSCULAR; INTRAVENOUS; SUBCUTANEOUS
Status: DISCONTINUED | OUTPATIENT
Start: 2019-02-20 | End: 2019-02-21 | Stop reason: HOSPADM

## 2019-02-20 RX ORDER — OXYCODONE HYDROCHLORIDE 5 MG/1
5 TABLET ORAL EVERY 6 HOURS PRN
Qty: 8 TABLET | Refills: 0 | Status: SHIPPED | OUTPATIENT
Start: 2019-02-20 | End: 2019-04-22

## 2019-02-20 RX ORDER — LIDOCAINE HYDROCHLORIDE AND EPINEPHRINE 10; 10 MG/ML; UG/ML
INJECTION, SOLUTION INFILTRATION; PERINEURAL PRN
Status: DISCONTINUED | OUTPATIENT
Start: 2019-02-20 | End: 2019-02-20 | Stop reason: HOSPADM

## 2019-02-20 RX ORDER — GLYCOPYRROLATE 0.2 MG/ML
INJECTION, SOLUTION INTRAMUSCULAR; INTRAVENOUS PRN
Status: DISCONTINUED | OUTPATIENT
Start: 2019-02-20 | End: 2019-02-20

## 2019-02-20 RX ORDER — PROPOFOL 10 MG/ML
INJECTION, EMULSION INTRAVENOUS PRN
Status: DISCONTINUED | OUTPATIENT
Start: 2019-02-20 | End: 2019-02-20

## 2019-02-20 RX ORDER — TETRACAINE HYDROCHLORIDE 5 MG/ML
SOLUTION OPHTHALMIC PRN
Status: DISCONTINUED | OUTPATIENT
Start: 2019-02-20 | End: 2019-02-20 | Stop reason: HOSPADM

## 2019-02-20 RX ORDER — KETOROLAC TROMETHAMINE 30 MG/ML
INJECTION, SOLUTION INTRAMUSCULAR; INTRAVENOUS PRN
Status: DISCONTINUED | OUTPATIENT
Start: 2019-02-20 | End: 2019-02-20

## 2019-02-20 RX ORDER — ERYTHROMYCIN 5 MG/G
OINTMENT OPHTHALMIC
Qty: 3.5 G | Refills: 0 | Status: SHIPPED | OUTPATIENT
Start: 2019-02-20 | End: 2020-08-22

## 2019-02-20 RX ORDER — ONDANSETRON 4 MG/1
4 TABLET, ORALLY DISINTEGRATING ORAL EVERY 30 MIN PRN
Status: DISCONTINUED | OUTPATIENT
Start: 2019-02-20 | End: 2019-02-21 | Stop reason: HOSPADM

## 2019-02-20 RX ORDER — SODIUM CHLORIDE, SODIUM LACTATE, POTASSIUM CHLORIDE, CALCIUM CHLORIDE 600; 310; 30; 20 MG/100ML; MG/100ML; MG/100ML; MG/100ML
INJECTION, SOLUTION INTRAVENOUS CONTINUOUS
Status: DISCONTINUED | OUTPATIENT
Start: 2019-02-20 | End: 2019-02-20 | Stop reason: HOSPADM

## 2019-02-20 RX ORDER — LIDOCAINE HYDROCHLORIDE 20 MG/ML
INJECTION, SOLUTION INFILTRATION; PERINEURAL PRN
Status: DISCONTINUED | OUTPATIENT
Start: 2019-02-20 | End: 2019-02-20

## 2019-02-20 RX ORDER — SODIUM CHLORIDE, SODIUM LACTATE, POTASSIUM CHLORIDE, CALCIUM CHLORIDE 600; 310; 30; 20 MG/100ML; MG/100ML; MG/100ML; MG/100ML
INJECTION, SOLUTION INTRAVENOUS CONTINUOUS
Status: DISCONTINUED | OUTPATIENT
Start: 2019-02-20 | End: 2019-02-21 | Stop reason: HOSPADM

## 2019-02-20 RX ADMIN — SODIUM CHLORIDE, SODIUM LACTATE, POTASSIUM CHLORIDE, CALCIUM CHLORIDE: 600; 310; 30; 20 INJECTION, SOLUTION INTRAVENOUS at 07:42

## 2019-02-20 RX ADMIN — KETOROLAC TROMETHAMINE 30 MG: 30 INJECTION, SOLUTION INTRAMUSCULAR; INTRAVENOUS at 08:36

## 2019-02-20 RX ADMIN — KETAMINE HYDROCHLORIDE 20 MG: 10 INJECTION, SOLUTION INTRAMUSCULAR; INTRAVENOUS at 08:36

## 2019-02-20 RX ADMIN — SODIUM CHLORIDE, SODIUM LACTATE, POTASSIUM CHLORIDE, CALCIUM CHLORIDE: 600; 310; 30; 20 INJECTION, SOLUTION INTRAVENOUS at 09:37

## 2019-02-20 RX ADMIN — ONDANSETRON 4 MG: 2 INJECTION INTRAMUSCULAR; INTRAVENOUS at 09:37

## 2019-02-20 RX ADMIN — LIDOCAINE HYDROCHLORIDE 100 MG: 20 INJECTION, SOLUTION INFILTRATION; PERINEURAL at 08:27

## 2019-02-20 RX ADMIN — SCOLOPAMINE TRANSDERMAL SYSTEM 1 PATCH: 1 PATCH, EXTENDED RELEASE TRANSDERMAL at 08:00

## 2019-02-20 RX ADMIN — ACETAMINOPHEN 975 MG: 325 TABLET ORAL at 07:41

## 2019-02-20 RX ADMIN — GLYCOPYRROLATE 0.2 MG: 0.2 INJECTION, SOLUTION INTRAMUSCULAR; INTRAVENOUS at 08:35

## 2019-02-20 RX ADMIN — PROPOFOL 200 MCG/KG/MIN: 10 INJECTION, EMULSION INTRAVENOUS at 08:27

## 2019-02-20 RX ADMIN — ONDANSETRON 4 MG: 2 INJECTION INTRAMUSCULAR; INTRAVENOUS at 08:35

## 2019-02-20 RX ADMIN — PROPOFOL: 10 INJECTION, EMULSION INTRAVENOUS at 09:13

## 2019-02-20 RX ADMIN — PROPOFOL 200 MG: 10 INJECTION, EMULSION INTRAVENOUS at 08:27

## 2019-02-20 ASSESSMENT — MIFFLIN-ST. JEOR: SCORE: 1272.71

## 2019-02-20 NOTE — DISCHARGE INSTRUCTIONS
Post-operative Instructions    Ophthalmic Plastic and Reconstructive Surgery  Nato Bradley M.D.  Malik Anderson MD, FLAVIO    All instructions apply to the operated eye(s) or eyelid(s)      What to expect after surgery:    There will be some swelling, bruising, and likely a black eye (even into the lower eyelids and cheeks). Also expect crusting and discharge from the eye and/or incisions.     A small amount of surface bleeding is normal for the first 48 hours after surgery.    You may notice some bloody tears for the first few days after surgery. This is normal.    Your eye(s) and eyelid(s) may be painful and tender. This is normal after surgery. Use the pain medication as prescribed. If your pain does not improve despite the medication, contact the office.    Wound care and personal care:    If a patch or bandage has been placed, please leave this in place until seen in clinic. Prevent the bandage from getting wet.     Apply ice compresses 15 minutes on 15 minutes off while awake for the first 2 days after surgery, then switch to warm compresses 4 times a day until seen by your physician.     For warm packs you can place a cup of dry uncooked rice in a clean cotton sock. Place sock in microwave 30 seconds to one minute. Next place the warm sock into a plastic bag and wrap the bag with clean warm wet washcloth and place over operated eye.      You may shower or wash your hair the day after surgery. Do not bathe or go swimming for 1 week to prevent contamination of your wounds.    Do not apply make-up to the eyes or eyelids for 2 weeks after surgery.      Activity restrictions and driving:    Avoid heavy lifting, bending, exercise or strenuous activity for 1 week after surgery.    You may resume other activities and return to work as tolerated.    You may not resume driving until have you stopped using narcotic pain medications(such as Norco, Percocet, Tylenol #3).    Medications:    Restart all your regular  home medications and eye drops today. If you take Plavix or Aspirin on a regular basis, wait for 3 days after your surgery before restarting these in order to decrease the risk of bleeding complications.    Avoid aspirin and aspirin-like medications (Motrin, Aleve, Ibuprofen, Luc[place] etc) for 5 days to reduce the risk of bleeding. You may take Tylenol (acetaminophen) for pain.    In addition to your home medications, take the following post-operative medications as prescribed by your physician:    Apply antibiotic ointment (erythromycin) to all sutures three times a day, and into the operated eye(s) at night.     Instill eye drops (Maxitrol) four times a day until the bottle finished.     Take Oral Antibiotics as Prescribed    Take 1 to 2 pain pills (tramadol or oxycodone as prescribed) as needed for pain up to every 4 hours.    The pain pills may make you drowsy. You must not drive a car, operate heavy machinery or drink alcohol while taking them.    The pain pills may cause constipation and nausea. Take them with some food to prevent a stomach upset. If you continue to experience nausea, call your physician.      WARNING: All the prescription pain medications listed above contain Tylenol (acetaminophen). You must not take more than 4,000 mg of acetaminophen per 24-hour period. This is equivalent to 6 tablets of Darvocet, 8 tablets of Vicodin, or 12 tablets of Norco, Percocet or Tylenol #3. If you take other over-the-counter medications containing acetaminophen, you must take the amount of acetaminophen into account and reduce the number of prescribed pain pills accordingly.    Contact information and follow-up:    Return to the Eye Clinic for a follow-up appointment with your physician as  scheduled. If no appointment has been scheduled, call [phone] for an  appointment with Dr. Bradley within 1 to 2 weeks from your date of surgery.      For severe pain, bleeding, or loss of vision, call the Eye Clinic  at [phone].    After hours or on weekends and holidays, call [phone] and ask to speak with the ophthalmologist on call.    [facility]  Home Care Following Anesthesia  For 24 hours after surgery:  1. Get plenty of rest.  A responsible adult must stay with you for at least 24 hours after you leave the surgery center.  2. Do not drive or use heavy equipment.  If you have weakness or tingling, don't drive or use heavy equipment until this feeling goes away.   3. Do not drink alcohol.   4. Avoid strenuous or risky activities.  Ask for help when climbing stairs.  5. You may feel lightheaded.  IF so, sit for a few minutes before standing.  Have someone help you get up.   6. If you have nausea (feel sick to your stomach): Drink only clear liquids such as apple juice, ginger ale, broth or 7-Up.  Rest may also help.  Be sure to drink enough fluids.  Move to a regular diet as you feel able.   7. You may have a slight fever.  Call the doctor if your fever is over 100 F (37.7 C) (taken under the tongue) or lasts longer than 24 hours.  8. You may have a dry mouth, a sore throat, muscle aches or trouble sleeping. These should go away after 24 hours.  9. Do not make important or legal decisions.    Tips for taking pain medications  To get the best pain relief possible, remember these points:    Take pain medications as directed, before pain becomes severe.    Pain medication can upset your stomach: taking it with food may help.    Constipation is a common side effect of pain medication. Drink plenty of  fluids.    Eat foods high in fiber. Take a stool softener if recommended by your doctor or pharmacist.    Do not drink alcohol, drive or operate machinery while taking pain medications.    Ask about other ways to control pain, such as with heat, ice or relaxation.    Tylenol/Acetaminophen Consumption  To help encourage the safe use of acetaminophen, the makers of TYLENOL  have lowered  the maximum daily dose for single-ingredient Extra Strength TYLENOL  (acetaminophen) products sold in the [place] from 8 pills per day (4,000 mg) to 6 pills per day (3,000 mg). The dosing interval has also changed from 2 pills every 4-6 hours to 2 pills every 6 hours.    If you feel your pain relief is insufficient, you may take Tylenol/Acetaminophen in addition to your narcotic pain medication.     Be careful not to exceed 3,000 mg of Tylenol/Acetaminophen in a 24 hour period from all sources.    If you are taking extra strength Tylenol/acetaminophen (500 mg), the maximum dose is 6 tablets in 24 hours.    If you are taking regular strength acetaminophen (325 mg), the maximum dose is 9 tablets in 24 hours.    ***You received 975mg of Tylenol at 07:45am***    Call a doctor for any of the followin. Signs of infection (fever, growing tenderness at the surgery site, a large amount of drainage or bleeding, severe pain, foul-smelling drainage, redness, swelling).  2. It has been over 8 to 10 hours since surgery and you are still not able to urinate (pass water).  3. Headache for over 24 hours.    Your doctor is:  Dr. Nato Bradley, Ophthalmology: [phone]                    Or dial [phone] and ask for the resident on call for:  Ophthalmology  For emergency care, call the:  [facility]:  [phone] (TTY for hearing impaired: [phone])     Scopolamine Patch- (Absorbed through the skin)    This medicine prevents nausea and vomiting caused by motion sickness or anesthesia.  The medicine is in a patch worn behind the ear.      Do NOT use the Scopolamine Patch if you have glaucoma or are allergic to scopolamine.    How to Use This Medicine:    The patch is applied behind the ear.    Keep the patch dry to prevent it from falling off.  Limit contact with water (no bathing or swimming).      If the patch is loose or falls off throw it away.  You do not need to apply a new patch.    After you  take off the patch or if it falls off, wash your hands and the area behind your ear with soap and water.      You can remove the patch tomorrow, or leave on for up to 3 days.    Only one patch should be used at any time.    How to Dispose of This Medicine:    Fold the used patch in half with the sticky sides together. Throw any used patch away so that children or pets cannot get to it. You will also need to throw away old patches after the expiration date has passed.    Keep all medicine away from children and never share your medicine with anyone.    Warnings While Using This Medicine:    This medicine can make you sleepy.  Avoid taking sleeping pills and other medicines that can make you sleepy while the patch is on.    Do not drink alcohol while the patch is on.    This medicine can cause temporary blurring and other vision problems if it comes in contact with the eyes.  This is not serious unless accompanied by eye pain and redness.     This medicine may cause problems with urination. If you have problems with urinating, remove the patch.  If you are unable to urinate, call your doctor.      This medicine may make you dizzy or drowsy. Avoid driving, using machines, or doing anything else that could be dangerous if the patch is on.    This medicine may make you sweat less and cause your body to get too hot. Be careful in hot weather or if you are exercising.    Make sure any doctor or dentist who treats you knows that you have the patch on. This medicine may affect the results of certain medical tests.    Skin burns have been reported at the patch site in several patients wearing an aluminized transdermal system during a magnetic resonance imaging scan (MRI).  Since this patch contains aluminum, it is recommended to remove the patch if you are having an MRI.    Possible Side Effects While Using This Medicine:    Dry mouth    Drowsiness    Temporary blurring of vision and widening of the pupils    Call your doctor  right away if you notice any of these side effects:    Allergic reaction: Itching or hives, swelling in your face or hands, swelling or tingling in your mouth or throat, chest tightness, trouble breathing.    Blurred vision that does not go away after the patch is removed    Confusion or memory loss    Fast,slow, or uneven heartbeat    Lightheadedness, dizziness, drowsiness, or fainting    Seeing, hearing, or feeling things that are not there    Restlessness    Severe eye pain    Trouble urinating    If you notice other side effects that you think are caused by this medicine, call your doctor immediately.

## 2019-02-20 NOTE — ANESTHESIA POSTPROCEDURE EVALUATION
Anesthesia POST Procedure Evaluation    Patient: Keyla Aragon   MRN:     [id number] Gender:   female   Age:    49 year old :      1970        Preoperative Diagnosis: Droopy Lid   Procedure(s):  Right Upper Eyelid Ptosis Repair  Bilateral Cosmetic Endoscopic Brow Lift   Postop Comments: No value filed.       Anesthesia Type:  General    Reportable Event: NO     PAIN: Uncomplicated   Sign Out status: Comfortable, Well controlled pain     PONV: No PONV   Sign Out status:  No Nausea or Vomiting     Neuro/Psych: Uneventful perioperative course   Sign Out Status: Preoperative baseline; Age appropriate mentation     Airway/Resp.: Uneventful perioperative course   Sign Out Status: Non labored breathing, age appropriate RR; Resp. Status within EXPECTED Parameters     CV: Uneventful perioperative course   Sign Out status: Appropriate BP and perfusion indices; Appropriate HR/Rhythm     Disposition:   Sign Out in:  PACU  Disposition:  Phase II; Home  Recovery Course: Uneventful  Follow-Up: Not required           Last Anesthesia Record Vitals:  CRNA VITALS  2019 0856 - 2019 0956      2019             Pulse:  96    SpO2:  95 %    Resp Rate (observed):  2  (Abnormal)     Resp Rate (set):  10          Last PACU/Preop Vitals:  Vitals:    19 1000 19 1015 19 1030   BP: 99/63 104/64 101/62   Pulse:      Resp: 18 20 18   Temp:   36.2  C (97.2  F)   SpO2: 94% 94% 95%         Electronically Signed By: Johnathan Delgadillo MD, 2019, 1:01 PM

## 2019-02-20 NOTE — ANESTHESIA PREPROCEDURE EVALUATION
Anesthesia Pre-Procedure Evaluation    Patient: Keyla Aragon   MRN:     [id number] Gender:   female   Age:    49 year old :      1970        Preoperative Diagnosis: Droopy Lid   Procedure(s):  Right Upper Eyelid Ptosis Repair  Bilateral Cosmetic Endoscopic Brow Lift     Past Medical History:   Diagnosis Date     Ankylosing spondylitis (H)      Arthritis     rheumatoid     Cholecystitis      Chronic pain      Heart murmur      PONV (postoperative nausea and vomiting)      Spinal headache       Past Surgical History:   Procedure Laterality Date     APPENDECTOMY       CHOLECYSTECTOMY       DISCECTOMY LUMBAR POSTERIOR MICROSCOPIC ONE LEVEL  10/27/2011    Procedure:DISCECTOMY LUMBAR POSTERIOR MICROSCOPIC ONE LEVEL; RIGHT T7-8 TRANSFACET TRANSPEDICULAR MICRODISCECTOMY WITH METRIX II ; Surgeon:FRANCIS CLARK; Location:[facility] OR     GYN SURGERY      , hyst and ovaries          Anesthesia Evaluation     . Pt has had prior anesthetic.     History of anesthetic complications   - PONV        ROS/MED HX    ENT/Pulmonary:  - neg pulmonary ROS     Neurologic:  - neg neurologic ROS     Cardiovascular:  - neg cardiovascular ROS       METS/Exercise Tolerance:     Hematologic:  - neg hematologic  ROS       Musculoskeletal: Comment: RA    Ankylosing Spondylitis - neg musculoskeletal ROS       GI/Hepatic:  - neg GI/hepatic ROS       Renal/Genitourinary:  - ROS Renal section negative       Endo:  - neg endo ROS       Psychiatric:  - neg psychiatric ROS       Infectious Disease:  - neg infectious disease ROS       Malignancy:      - no malignancy   Other: Comment: Etoh Abuse    Narcotic Abuse   - neg other ROS                     PHYSICAL EXAM:   Mental Status/Neuro: A/A/O   Airway: Facies: Feasible  Mallampati: I  Mouth/Opening: Full  TM distance: > 6 cm  Neck ROM: Full   Respiratory: Auscultation: CTAB     Resp. Rate: Normal     Resp. Effort: Normal      CV: Rhythm: Regular  Rate: Age appropriate  Heart:  "Normal Sounds   Comments:      Dental: Normal                  Lab Results   Component Value Date    WBC 5.6 03/03/2017    HGB 14.0 03/03/2017    HCT 42.1 03/03/2017     03/03/2017    CRP <5.0 06/24/2013    SED 4 06/24/2013     03/03/2017    POTASSIUM 3.7 03/03/2017    CHLORIDE 106 03/03/2017    CO2 28 03/03/2017    BUN 9 03/03/2017    CR 0.76 03/03/2017    GLC 90 03/03/2017    KI 9.0 03/03/2017    ALBUMIN 3.9 03/03/2017    PROTTOTAL 6.9 03/03/2017    ALT 20 03/03/2017    AST 15 03/03/2017    ALKPHOS 49 03/03/2017    BILITOTAL 0.4 03/03/2017    LIPASE 220 03/03/2017    HCGS Negative 05/14/2014       Preop Vitals  BP Readings from Last 3 Encounters:   02/20/19 116/85   03/03/17 120/84   05/14/14 111/80    Pulse Readings from Last 3 Encounters:   02/20/19 80   05/05/14 67   06/22/13 84      Resp Readings from Last 3 Encounters:   02/20/19 16   03/03/17 19   05/14/14 16    SpO2 Readings from Last 3 Encounters:   02/20/19 96%   03/03/17 96%   05/14/14 97%      Temp Readings from Last 1 Encounters:   02/20/19 36.5  C (97.7  F) (Oral)    Ht Readings from Last 1 Encounters:   02/20/19 1.676 m (5' 6\")      Wt Readings from Last 1 Encounters:   02/20/19 62.6 kg (138 lb)    Estimated body mass index is 22.27 kg/m  as calculated from the following:    Height as of this encounter: 1.676 m (5' 6\").    Weight as of this encounter: 62.6 kg (138 lb).     LDA:  Peripheral IV 02/20/19 Right Hand (Active)   Site Assessment WDL 2/20/2019  7:53 AM   Line Status Infusing 2/20/2019  7:53 AM   Phlebitis Scale 0-->no symptoms 2/20/2019  7:53 AM   Infiltration Scale 0 2/20/2019  7:53 AM   Extravasation? No 2/20/2019  7:53 AM   Number of days: 0            Assessment:   ASA SCORE: 2    NPO Status: > 6 hours since completed Solid Foods   Documentation: H&P complete; Preop Testing complete; Consents complete   Proceeding: Proceed without further delay  Tobacco Use:  NO Active use of Tobacco/UNKNOWN Tobacco use status     Plan: "   Anes. Type:  General   Pre-Induction: Midazolam IV; Acetaminophen PO   Induction:  IV (Standard)   Airway: LMA   Access/Monitoring: PIV   Maintenance: Balanced   Emergence: Procedure Site   Logistics: Same Day Surgery     Postop Pain/Sedation Strategy:  Standard-Options: Opioids PRN     PONV Management:  Adult Risk Factors: Female, H/o PONV or Motion Sickness, Non-Smoker, Postop Opioids  Prevention: Ondansetron; Dexamethasone     CONSENT: Direct conversation   Plan and risks discussed with: Patient   Blood Products: Consent Deferred (Minimal Blood Loss)                         Johnathan Delgadillo MD

## 2019-02-20 NOTE — ANESTHESIA CARE TRANSFER NOTE
Patient: Keyla Aragon    Procedure(s):  Right Upper Eyelid Ptosis Repair  Bilateral Cosmetic Endoscopic Brow Lift    Diagnosis: Droopy Lid  Diagnosis Additional Information: No value filed.    Anesthesia Type:   No value filed.     Note:  Airway :Face Mask  Patient transferred to:PACU  Comments: 101/65  98%  97.5-89-18  Handoff Report: Identifed the Patient, Identified the Reponsible Provider, Reviewed the pertinent medical history, Discussed the surgical course, Reviewed Intra-OP anesthesia mangement and issues during anesthesia, Set expectations for post-procedure period and Allowed opportunity for questions and acknowledgement of understanding      Vitals: (Last set prior to Anesthesia Care Transfer)    CRNA VITALS  2/20/2019 0856 - 2/20/2019 0931      2/20/2019             Pulse:  96    SpO2:  95 %    Resp Rate (observed):  2  (Abnormal)     Resp Rate (set):  10                Electronically Signed By: YOLIE Marinelli CRNA  February 20, 2019  9:31 AM

## 2019-02-20 NOTE — OP NOTE
PREOPERATIVE DIAGNOSIS:     1.  Right upper lid ptosis.   2.  Bilateral temporal brow ptosis.      POSTOPERATIVE DIAGNOSES:   1.  Right upper lid ptosis.   2.  Bilateral temporal brow ptosis.      PROCEDURE:   1.  Right upper lid ptosis repair by Brown's muscle conjunctival resection.   2.  Bilateral cosmetic temporal brow lift.       ASSISTANT:  Malik Anderson MD and  Eduardo Wilder MD      ANESTHESIA:  General with local infiltration of 1% lidocaine with [id number] epinephrine.      COMPLICATIONS:  None.      ESTIMATED BLOOD LOSS:  Less than 10 mL.       INDICATION:  Keyla Argaon presented with temporal brow ptosis and right upper lid ptosis.  After the risks, benefits and alternatives to the proposed procedure were explained, informed consent was obtained.      DESCRIPTION OF PROCEDURE:  The patient was brought to the operating room and placed supine on the operating table.  General anesthesia was induced.The area of the brow to be elevated was marked just posterior to the hairline in a line extending from the lateral nasal ala through the lateral limbus and straddled the temporal fusion line.  These areas and the right upper lid were all infiltrated with local anesthetic.  She was prepped and draped in the typical sterile fashion for facial plastic surgery.      Atttention was directed to the right  side.  A 4-0 silk suture was placed through the eyelid margin and the eyelid everted over a Desmarres retractor. A 6-0 silk suture was then threaded through the conjunctiva and Brown's muscle 4.25 mm from the superior tarsal border. These sutures were used to elevate the conjunctiva and Brown's muscle which was gently peeled from the underlying levator muscle. The Putterman clamp was used and clamped over the elevated tissues. A 6-0 plain gut suture was run in a horizontal mattress fashion 1 mm below the clamp from lateral to medial, then medial to lateral. The elevated tissues were excised with a 15 blade.  The sutures were then externalized and tied in the lid crease. The 4-0 silk suture was removed. The lid was reverted to its normal position and ophthalmic antibiotic ointment placed in the eye.     Temple incision was made for approximately 2.5 cm with a #15 blade.  Dissection was then carried down through the subcutaneous tissue with the monopolar cautery.  The superficial temporalis fascia was opened with the Metzenbaum scissors.  Dissection was then carried onto the deep temporalis fascia down to the orbital rim.  I released the cojoined tendon along the temporal fusion line, and this was released from temporal to the central spaces with the elevator.  The conjoined fascia was released down to the lateral orbital rim.  Using the endoscopic elevator under direct visualization, we dissected on the deep temporalis fascia onto the lateral orbital rim and released all the periosteal attachments.  Hemostasis was obtained with monopolar cautery.  We then secured the superficial temporalis fascia to the deep temporalis fascia with 3-0 PDS sutures while elevating the temporal brow.  This gave an excellent brow contour.  The temple incision was closed with surgical staples.  Attention was direct left side, where the same procedure was performed.     The patient tolerated the procedure well.  She was awoken from general anesthesia and taken to recovery room in stable condition.         ITZEL WAGONER MD

## 2019-02-20 NOTE — BRIEF OP NOTE
[facility] Brief Operative Note    Pre-operative diagnosis: Droopy Lid   Post-operative diagnosis Same   Procedure: Procedure(s):  Right Upper Eyelid Ptosis Repair  Bilateral Cosmetic Endoscopic Brow Lift   Surgeon: Nato Bradley M.D.      Assistants(s): Malik Anderson M.D.   Estimated blood loss: Less than 10 mL   Specimens: None   Findings: As expected

## 2019-02-22 ENCOUNTER — TELEPHONE (OUTPATIENT)
Dept: OPHTHALMOLOGY | Facility: CLINIC | Age: 49
End: 2019-02-22

## 2019-02-22 NOTE — TELEPHONE ENCOUNTER
Telephone call to Keyla Aragon    Doing well with no pain, good vision, and no bleeding. All questions were answered, she is doing well, and postoperative care was reviewed.  A postop appointment has been scheduled.    Nato Bradley

## 2019-03-04 ENCOUNTER — OFFICE VISIT (OUTPATIENT)
Dept: OPHTHALMOLOGY | Facility: CLINIC | Age: 49
End: 2019-03-04
Payer: COMMERCIAL

## 2019-03-04 ENCOUNTER — HOSPITAL ENCOUNTER (OUTPATIENT)
Dept: MAMMOGRAPHY | Facility: CLINIC | Age: 49
Discharge: HOME | End: 2019-03-04
Attending: FAMILY MEDICINE | Admitting: FAMILY MEDICINE
Payer: COMMERCIAL

## 2019-03-04 ENCOUNTER — TELEPHONE (OUTPATIENT)
Dept: OPHTHALMOLOGY | Facility: CLINIC | Age: 49
End: 2019-03-04

## 2019-03-04 DIAGNOSIS — H57.819 BROW PTOSIS: Primary | ICD-10-CM

## 2019-03-04 DIAGNOSIS — Z98.890 POSTOPERATIVE EYE STATE: ICD-10-CM

## 2019-03-04 DIAGNOSIS — Z12.31 SCREENING MAMMOGRAM, ENCOUNTER FOR: ICD-10-CM

## 2019-03-04 PROCEDURE — 77063 BREAST TOMOSYNTHESIS BI: CPT

## 2019-03-04 ASSESSMENT — LEVATOR FUNCTION
OD_LEVATOR: 15
OS_LEVATOR: 15

## 2019-03-04 ASSESSMENT — VISUAL ACUITY
OS_SC: 20/20
METHOD: SNELLEN - LINEAR
OD_SC: 20/20
OS_SC+: -5

## 2019-03-04 ASSESSMENT — MARGIN REFLEX DISTANCE
OD_MRD1: 2
OS_MRD1: 3

## 2019-03-04 ASSESSMENT — EXTERNAL EXAM - RIGHT EYE: OD_EXAM: PERIORBITAL EDEMA

## 2019-03-04 ASSESSMENT — PATIENT HEALTH QUESTIONNAIRE - PHQ9
SUM OF ALL RESPONSES TO PHQ QUESTIONS 1-9: 4
SUM OF ALL RESPONSES TO PHQ QUESTIONS 1-9: 4
10. IF YOU CHECKED OFF ANY PROBLEMS, HOW DIFFICULT HAVE THESE PROBLEMS MADE IT FOR YOU TO DO YOUR WORK, TAKE CARE OF THINGS AT HOME, OR GET ALONG WITH OTHER PEOPLE: SOMEWHAT DIFFICULT

## 2019-03-04 ASSESSMENT — SLIT LAMP EXAM - LIDS
COMMENTS: INCISIONS CLEAN/DRY/INTACT
COMMENTS: INCISIONS CLEAN/DRY/INTACT

## 2019-03-04 ASSESSMENT — TONOMETRY
OD_IOP_MMHG: 13
OS_IOP_MMHG: 14
IOP_METHOD: ICARE

## 2019-03-04 ASSESSMENT — EXTERNAL EXAM - LEFT EYE: OS_EXAM: PERIORBITAL EDEMA

## 2019-03-04 NOTE — NURSING NOTE
Chief Complaints and History of Present Illnesses   Patient presents with     Post Op (Ophthalmology) Both Eyes     Chief Complaint(s) and History of Present Illness(es)     Post Op (Ophthalmology) Both Eyes     Laterality: both eyes    Associated symptoms: tearing.  Negative for eye pain and dryness              Comments     Patient notes she is doing well, denies pain, itching, burning, or discharge    Dana Cotto March 4, 2019 1:08 PM

## 2019-03-04 NOTE — PROGRESS NOTES
Chief Complaints and History of Present Illnesses   Patient presents with     Post Op (Ophthalmology) Both Eyes     Chief Complaint(s) and History of Present Illness(es)     Post Op (Ophthalmology) Both Eyes     In both eyes.  Associated symptoms include tearing.  Negative for eye   pain and dryness.        Comments     Patient notes she is doing well, denies pain, itching, burning, or   discharge    Dana Yadiel March 4, 2019 1:08 PM                       Assessment & Plan     Keyla Aragon is a 49 year old female with the following diagnoses:   1. Brow ptosis    2. Postoperative eye state       Staples out today  Continue warm soaks twice a day  Return to clinic 6-8 weeks                Attending Physician Attestation:  I have seen and examined this patient.  I have confirmed and edited as necessary the chief complaint(s), history of present illness, review of systems, relevant history, and examination findings as documented by others.  I have personally reviewed the relevant tests, images, and reports as documented above.  I have confirmed and edited as necessary the assessment and plan and agree with this note.    - Nato Bradley MD 1:13 PM 3/4/2019

## 2019-03-05 ASSESSMENT — PATIENT HEALTH QUESTIONNAIRE - PHQ9: SUM OF ALL RESPONSES TO PHQ QUESTIONS 1-9: 4

## 2019-03-09 ENCOUNTER — HEALTH MAINTENANCE LETTER (OUTPATIENT)
Age: 49
End: 2019-03-09

## 2019-04-15 ENCOUNTER — TELEPHONE (OUTPATIENT)
Dept: OPHTHALMOLOGY | Facility: CLINIC | Age: 49
End: 2019-04-15

## 2019-04-15 DIAGNOSIS — Z98.890 POSTOPERATIVE EYE STATE: Primary | ICD-10-CM

## 2019-04-15 RX ORDER — MUPIROCIN 20 MG/G
OINTMENT TOPICAL 3 TIMES DAILY
Qty: 15 G | Refills: 0 | Status: SHIPPED | OUTPATIENT
Start: 2019-04-15 | End: 2019-04-25

## 2019-04-15 RX ORDER — DOXYCYCLINE 100 MG/1
100 TABLET ORAL 2 TIMES DAILY
Qty: 14 TABLET | Refills: 0 | Status: SHIPPED | OUTPATIENT
Start: 2019-04-15 | End: 2019-04-22

## 2019-04-15 NOTE — TELEPHONE ENCOUNTER
Spoke with patient earlier today. She notes the left hairline incision is red and painful, worse since this past Saturday. She notes that since the surgery the left incision hasn't felt quite right compared to the right incision. We discussed this could potentially be signs of early infection. We decided to start oral antibiotics (Doxycycline 100mg BID x7days) and topical mupirocin ointment TID to the incision. She will also come to clinic on Wednesday 4/17/19 for evaluation.  She will call back with any worsening.    Malik Anderson MD, FLAVIO  Oculofacial Plastics and Orbit Surgery Fellow

## 2019-04-17 ENCOUNTER — OFFICE VISIT (OUTPATIENT)
Dept: OPHTHALMOLOGY | Facility: CLINIC | Age: 49
End: 2019-04-17
Payer: COMMERCIAL

## 2019-04-17 DIAGNOSIS — Z98.890 POSTOPERATIVE EYE STATE: Primary | ICD-10-CM

## 2019-04-17 ASSESSMENT — TONOMETRY
IOP_METHOD: ICARE
OS_IOP_MMHG: 14
OD_IOP_MMHG: 14

## 2019-04-17 ASSESSMENT — VISUAL ACUITY
OS_SC: 20/20
OD_SC: 20/20
OS_SC+: -3
METHOD: SNELLEN - LINEAR

## 2019-04-17 ASSESSMENT — SLIT LAMP EXAM - LIDS
COMMENTS: NORMAL
COMMENTS: NORMAL

## 2019-04-17 ASSESSMENT — EXTERNAL EXAM - RIGHT EYE: OD_EXAM: INCISIONS CLEAN/DRY/INTACT

## 2019-04-17 NOTE — NURSING NOTE
Chief Complaints and History of Present Illnesses   Patient presents with     Post Op (Ophthalmology) Both Eyes     Chief Complaint(s) and History of Present Illness(es)     Post Op (Ophthalmology) Both Eyes     Laterality: both eyes              Comments     S/p Right upper lid ptosis repair by Brown's muscle conjunctival resection and Bilateral cosmetic temporal brow lift on 2/20/2019. Pt states last Tuesday noticed more swelling at the incision on the left it would come and go. Pt had a really terrible headache on Saturday and was at urgent care- felt there was a possible infection.  Doxycyline and topical medication since Monday. Pt feels pressure in her left scalp. Pt has not been feeling well- feels it could be due to an infection.     Kae GIBSON 10:31 AM April 17, 2019

## 2019-04-17 NOTE — PROGRESS NOTES
Assessment    Kelya Aragon is a 49 year old female with the following diagnoses:   1. Postoperative eye state         S/p RUL MMCR, Bilat Endoscopic Temporal brow lift 2/2019  -Left hairline incision w/ erythema, mild edema, crusting   -- she noted intermittent edema at only the left incision site  -- unclear etiology, possibly atypical or staph infection vs,  suture reaction  --  right hairline incision is healing well  -Lids in excellent position          PLAN:    Finish antibiotic ointment to the incision site BID  Finish PO Doxycycline 100mg BID x 7days    Return to clinic in 1 week or as needed worsening         Malik Anderson MD, FLAVIO  Oculofacial Plastics and Orbit Surgery Fellow    Attending Physician Attestation:  Complete documentation of historical and exam elements from today's encounter can be found in the full encounter summary report (not reduplicated in this progress note).  I personally obtained the chief complaint(s) and history of present illness.  I confirmed and edited as necessary the review of systems, past medical/surgical history, family history, social history, and examination findings as documented by others; and I examined the patient myself.  I personally reviewed the relevant tests, images, and reports as documented above.  I formulated and edited as necessary the assessment and plan and discussed the findings and management plan with the patient and family. I personally reviewed the ophthalmic test(s) associated with this encounter, agree with the interpretation(s) as documented by the resident/fellow, and have edited the corresponding report(s) as necessary.   -Nato Bradley MD

## 2019-04-18 ENCOUNTER — TELEPHONE (OUTPATIENT)
Dept: OPHTHALMOLOGY | Facility: CLINIC | Age: 49
End: 2019-04-18

## 2019-04-19 ENCOUNTER — TELEPHONE (OUTPATIENT)
Dept: OPHTHALMOLOGY | Facility: CLINIC | Age: 49
End: 2019-04-19

## 2019-04-19 NOTE — TELEPHONE ENCOUNTER
Called pt today at 240pm. No answer, left msg. Stated that I reviewed the photo from yesterday, and it's unclear why she had that red line but it is very unlikely to be infectious given that she's on PO doxycycline now (and was on PO clindamycin immediately post op). I discussed she should continue to take her PO abx as prescribed, keep using the abx ointment, and we will see her again Monday morning in clinic. She should call back if she has any worsening, or other concerning symptoms.    Malik Anderson MD, FLAVIO  Oculofacial Plastics and Orbit Surgery Fellow

## 2019-04-19 NOTE — TELEPHONE ENCOUNTER
Spoke to patient about red line coming from hairline to eyebrow.  Patient currently on Doxycycline. Will continue medication for now. She will take another picture in the AM and send to Dr. Anderson. We will call her back tomorrow morning and adjust plan depending on change in the red line. Patient in agreement with stated plan.    Ronnell Parker MD  PGY-3 Ophthalmology Resident  [phone]

## 2019-04-22 ENCOUNTER — OFFICE VISIT (OUTPATIENT)
Dept: OPHTHALMOLOGY | Facility: CLINIC | Age: 49
End: 2019-04-22
Payer: COMMERCIAL

## 2019-04-22 ENCOUNTER — TELEPHONE (OUTPATIENT)
Dept: OPHTHALMOLOGY | Facility: CLINIC | Age: 49
End: 2019-04-22

## 2019-04-22 DIAGNOSIS — H57.819 BROW PTOSIS: Primary | ICD-10-CM

## 2019-04-22 DIAGNOSIS — Z98.890 POSTOPERATIVE EYE STATE: ICD-10-CM

## 2019-04-22 DIAGNOSIS — H02.401 PTOSIS OF EYELID, RIGHT: ICD-10-CM

## 2019-04-22 ASSESSMENT — SLIT LAMP EXAM - LIDS
COMMENTS: NORMAL
COMMENTS: NORMAL

## 2019-04-22 ASSESSMENT — VISUAL ACUITY
METHOD: SNELLEN - LINEAR
OD_SC: 20/20
OS_SC: 20/20

## 2019-04-22 ASSESSMENT — TONOMETRY
IOP_METHOD: ICARE
OD_IOP_MMHG: 13
OS_IOP_MMHG: 11

## 2019-04-22 ASSESSMENT — EXTERNAL EXAM - RIGHT EYE: OD_EXAM: INCISIONS CLEAN/DRY/INTACT

## 2019-04-22 NOTE — PROGRESS NOTES
Assessment    Keyla Aragon is a 49 year old female with the following diagnoses:   1. Brow ptosis    2. Ptosis of eyelid, right    3. Postoperative eye state         S/p RUL MMCR, Bilat Endoscopic Brow  - stopped Mupirocin on Tuesday, started using Clindamycin topical ointment  - today is last day of Doxycycline PO  - Red line on forehead from Friday now resolved  - Continues to have tightness/headaches, kind of like her tension headaches she had before  - feels less sick than last week    PLAN:    Cont  antibiotic ointment to the incision site BID  -- Then use Vaseline/Aquaphor/Vit E Oil to incisions BID  Finish Doxycycline as prescribed (finish 7days)    Ok to have botox for headaches    F/u 2 months, sooner prn         Malik Anderson MD, FLAVIO  Oculofacial Plastics and Orbit Surgery Fellow    Attending Physician Attestation:  Complete documentation of historical and exam elements from today's encounter can be found in the full encounter summary report (not reduplicated in this progress note).  I personally obtained the chief complaint(s) and history of present illness.  I confirmed and edited as necessary the review of systems, past medical/surgical history, family history, social history, and examination findings as documented by others; and I examined the patient myself.  I personally reviewed the relevant tests, images, and reports as documented above.  I formulated and edited as necessary the assessment and plan and discussed the findings and management plan with the patient and family. I personally reviewed the ophthalmic test(s) associated with this encounter, agree with the interpretation(s) as documented by the resident/fellow, and have edited the corresponding report(s) as necessary.   -Nato Bradley MD

## 2019-04-22 NOTE — NURSING NOTE
Chief Complaints and History of Present Illnesses   Patient presents with     Post Op (Ophthalmology) Both Eyes     Chief Complaint(s) and History of Present Illness(es)     Post Op (Ophthalmology) Both Eyes     Laterality: both eyes    Onset: 1 week ago              Comments     S/p RUL MMCR, Bilat Endoscopic Temporal brow lift 2/2019  Pt. States that pain is improving.   Still some tenderness.  No change in VA BE.  Daisy Sloan COT 10:18 AM April 22, 2019

## 2019-04-23 ENCOUNTER — TELEPHONE (OUTPATIENT)
Dept: OPHTHALMOLOGY | Facility: CLINIC | Age: 49
End: 2019-04-23

## 2019-04-23 NOTE — TELEPHONE ENCOUNTER
Writer spoke w/ fellow Dr Anderson and phoned pt for update on her symptoms. Left message to call back. If not improved or worsening, recommend she comes to clinic in 1-2 days. If doing better, f/u in 1 month.       Kelvin Aldana MD  Department of Ophthalmology - PGY2

## 2019-04-23 NOTE — TELEPHONE ENCOUNTER
"Writer returned page from pt regarding recurrence of left brow and upper eyelid swelling, scalp redness, and left-sided headache. These symptoms were reportedly similar to that which she was evaluated for on 4/17 and earlier today in clinic. Pt had improvement in these symptoms through 4/20 before experiencing worsening yesterday. She thought things were better this morning while in clinic before worsening again this evening. She also notes new \"kidney\" pain, but has not had fever, hematuria, or dysuria. Pt reports a history of renal stones in 20 years ago. Writer notified pt that kidney pain is beyond the scope of writer's expertise and advised pt f/u with a general medical provider, urgently for severe pain. Writer offered to evaluate pt in the ED tonight for her ocular symptoms. She elects to continue previously recommended treatment plan and await to hear from Dr Bradley's office in the morning regarding appropriate clinic f/u. All questions were answered.     Kelvin Aldana MD  Department of Ophthalmology - PGY2    "

## 2019-04-23 NOTE — TELEPHONE ENCOUNTER
I called to check in with the patient and got her voicemail. I left a message letting her know she could come see Dr. Bradley tomorrow afternoon at the Peds clinic at 12:15 if she is still having concerns. I left her my direct # to call back and let me know.  Madeline Edward RN 12:27 PM 04/23/19

## 2019-06-24 ENCOUNTER — OFFICE VISIT (OUTPATIENT)
Dept: OPHTHALMOLOGY | Facility: CLINIC | Age: 49
End: 2019-06-24
Payer: COMMERCIAL

## 2019-06-24 DIAGNOSIS — H57.819 BROW PTOSIS: Primary | ICD-10-CM

## 2019-06-24 DIAGNOSIS — R51.9 HEADACHE DISORDER: ICD-10-CM

## 2019-06-24 DIAGNOSIS — Z98.890 POSTOPERATIVE EYE STATE: ICD-10-CM

## 2019-06-24 PROBLEM — M51.34 DDD (DEGENERATIVE DISC DISEASE), THORACIC: Status: ACTIVE | Noted: 2019-06-24

## 2019-06-24 PROBLEM — F19.20 DEPENDENT DRUG ABUSE (H): Status: ACTIVE | Noted: 2017-02-03

## 2019-06-24 PROBLEM — M79.7 FIBROMYALGIA: Status: ACTIVE | Noted: 2019-06-24

## 2019-06-24 PROBLEM — M19.90 ARTHRITIS: Status: ACTIVE | Noted: 2017-03-01

## 2019-06-24 ASSESSMENT — EXTERNAL EXAM - LEFT EYE: OS_EXAM: NORMAL

## 2019-06-24 ASSESSMENT — SLIT LAMP EXAM - LIDS
COMMENTS: NORMAL
COMMENTS: NORMAL

## 2019-06-24 ASSESSMENT — TONOMETRY
OD_IOP_MMHG: 16
OS_IOP_MMHG: 11
IOP_METHOD: ICARE

## 2019-06-24 ASSESSMENT — VISUAL ACUITY
OD_SC+: -2
METHOD: SNELLEN - LINEAR
OS_SC: 20/20
OD_SC: 20/20

## 2019-06-24 ASSESSMENT — EXTERNAL EXAM - RIGHT EYE: OD_EXAM: NORMAL

## 2019-06-24 NOTE — NURSING NOTE
Chief Complaints and History of Present Illnesses   Patient presents with     Follow Up     POM#4 s/p RUL MMCR, Bilateral endoscopic brow     Chief Complaint(s) and History of Present Illness(es)     Follow Up     Laterality: both eyes    Associated symptoms: Negative for headache and eye pain    Comments: POM#4 s/p RUL MMCR, Bilateral endoscopic brow              Comments     Patient notes that she has been doing a lot better, swelling has gone down, no concerns. recieved the botox about 3 weeks ago for HA, doing well.     Dnaa Cotto June 24, 2019 10:28 AM

## 2019-06-24 NOTE — PROGRESS NOTES
Chief Complaints and History of Present Illnesses   Patient presents with     Follow Up     POM#4 s/p RUL MMCR, Bilateral endoscopic brow     Chief Complaint(s) and History of Present Illness(es)     Follow Up     In both eyes.  Associated symptoms include Negative for headache and eye   pain. Additional comments: POM#4 s/p RUL MMCR, Bilateral endoscopic brow       Comments     Patient notes that she has been doing a lot better, swelling has gone   down, no concerns. recieved the botox about 3 weeks ago for HA, doing   well.     Dana Cotto June 24, 2019 10:28 AM         Assessment & Plan     Keyla Aragon is a 49 year old female with the following diagnoses:   1. Brow ptosis    2. Postoperative eye state    3. Headache disorder         Refer to neurology for Botox for Migraine  Return to clinic as needed           Attending Physician Attestation:  I have seen and examined this patient.  I have confirmed and edited as necessary the chief complaint(s), history of present illness, review of systems, relevant history, and examination findings as documented by others.  I have personally reviewed the relevant tests, images, and reports as documented above.  I have confirmed and edited as necessary the assessment and plan and agree with this note.    - Nato Bradley MD 11:04 AM 6/24/2019

## 2019-10-01 ENCOUNTER — HEALTH MAINTENANCE LETTER (OUTPATIENT)
Age: 49
End: 2019-10-01

## 2020-02-20 DIAGNOSIS — Z12.31 VISIT FOR SCREENING MAMMOGRAM: ICD-10-CM

## 2020-02-20 PROCEDURE — 77063 BREAST TOMOSYNTHESIS BI: CPT | Mod: TC

## 2020-02-20 PROCEDURE — 77067 SCR MAMMO BI INCL CAD: CPT | Mod: TC

## 2020-03-22 ENCOUNTER — HEALTH MAINTENANCE LETTER (OUTPATIENT)
Age: 50
End: 2020-03-22

## 2020-08-11 ENCOUNTER — HOSPITAL ENCOUNTER (EMERGENCY)
Facility: CLINIC | Age: 50
Discharge: LEFT BEFORE DISCHARGE | End: 2020-08-11
Admitting: EMERGENCY MEDICINE
Payer: MEDICARE

## 2020-08-11 VITALS
WEIGHT: 113 LBS | RESPIRATION RATE: 20 BRPM | TEMPERATURE: 98 F | HEIGHT: 66 IN | SYSTOLIC BLOOD PRESSURE: 153 MMHG | HEART RATE: 107 BPM | DIASTOLIC BLOOD PRESSURE: 103 MMHG | BODY MASS INDEX: 18.16 KG/M2 | OXYGEN SATURATION: 97 %

## 2020-08-11 PROCEDURE — 25000128 H RX IP 250 OP 636: Performed by: EMERGENCY MEDICINE

## 2020-08-11 PROCEDURE — 40000268 ZZH STATISTIC NO CHARGES

## 2020-08-11 RX ORDER — ONDANSETRON 4 MG/1
4 TABLET, ORALLY DISINTEGRATING ORAL ONCE
Status: COMPLETED | OUTPATIENT
Start: 2020-08-11 | End: 2020-08-11

## 2020-08-11 RX ADMIN — ONDANSETRON 4 MG: 4 TABLET, ORALLY DISINTEGRATING ORAL at 14:15

## 2020-08-11 ASSESSMENT — MIFFLIN-ST. JEOR: SCORE: 1154.31
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
## 2020-08-11 NOTE — ED NOTES
"Pts friend states \"we are going to go get something to eat and we will be back\" I explained that if they left, they would be taken out of line to see a Dr but told the visitor she could go get something to eat. They went back and sat down and then got up and said \"[facility] wait is only 22 min, we are going there\" and walked out the door.   "

## 2020-08-22 ENCOUNTER — HOSPITAL ENCOUNTER (INPATIENT)
Facility: CLINIC | Age: 50
LOS: 1 days | Discharge: LEFT BEFORE DISCHARGE | DRG: 894 | End: 2020-08-23
Attending: EMERGENCY MEDICINE | Admitting: PSYCHIATRY & NEUROLOGY
Payer: MEDICARE

## 2020-08-22 DIAGNOSIS — F10.920 ALCOHOLIC INTOXICATION WITHOUT COMPLICATION (H): ICD-10-CM

## 2020-08-22 DIAGNOSIS — F10.220 ALCOHOL DEPENDENCE WITH UNCOMPLICATED INTOXICATION (H): ICD-10-CM

## 2020-08-22 DIAGNOSIS — Z03.818 ENCNTR FOR OBS FOR SUSP EXPSR TO OTH BIOLG AGENTS RULED OUT: ICD-10-CM

## 2020-08-22 DIAGNOSIS — F10.20 ALCOHOL DEPENDENCE, EPISODIC DRINKING BEHAVIOR (H): Primary | ICD-10-CM

## 2020-08-22 PROBLEM — F10.939 ALCOHOL WITHDRAWAL SYNDROME, WITH UNSPECIFIED COMPLICATION (H): Status: ACTIVE | Noted: 2020-08-22

## 2020-08-22 LAB
ALBUMIN SERPL-MCNC: 3.6 G/DL (ref 3.4–5)
ALP SERPL-CCNC: 72 U/L (ref 40–150)
ALT SERPL W P-5'-P-CCNC: 39 U/L (ref 0–50)
AMPHETAMINES UR QL SCN: NEGATIVE
ANION GAP SERPL CALCULATED.3IONS-SCNC: 9 MMOL/L (ref 3–14)
APAP SERPL-MCNC: <2 MG/L (ref 10–20)
AST SERPL W P-5'-P-CCNC: 37 U/L (ref 0–45)
BARBITURATES UR QL: NEGATIVE
BASOPHILS # BLD AUTO: 0 10E9/L (ref 0–0.2)
BASOPHILS NFR BLD AUTO: 0.2 %
BENZODIAZ UR QL: POSITIVE
BILIRUB SERPL-MCNC: 0.4 MG/DL (ref 0.2–1.3)
BUN SERPL-MCNC: 10 MG/DL (ref 7–30)
CALCIUM SERPL-MCNC: 8.7 MG/DL (ref 8.5–10.1)
CANNABINOIDS UR QL SCN: POSITIVE
CHLORIDE SERPL-SCNC: 110 MMOL/L (ref 94–109)
CO2 SERPL-SCNC: 28 MMOL/L (ref 20–32)
COCAINE UR QL: NEGATIVE
CREAT SERPL-MCNC: 0.72 MG/DL (ref 0.52–1.04)
DIFFERENTIAL METHOD BLD: NORMAL
EOSINOPHIL # BLD AUTO: 0 10E9/L (ref 0–0.7)
EOSINOPHIL NFR BLD AUTO: 0 %
ERYTHROCYTE [DISTWIDTH] IN BLOOD BY AUTOMATED COUNT: 14.1 % (ref 10–15)
ETHANOL SERPL-MCNC: 0.34 G/DL
ETHANOL UR QL SCN: POSITIVE
GFR SERPL CREATININE-BSD FRML MDRD: >90 ML/MIN/{1.73_M2}
GGT SERPL-CCNC: 178 U/L (ref 0–40)
GLUCOSE SERPL-MCNC: 109 MG/DL (ref 70–99)
HCT VFR BLD AUTO: 40.5 % (ref 35–47)
HGB BLD-MCNC: 13.2 G/DL (ref 11.7–15.7)
IMM GRANULOCYTES # BLD: 0.1 10E9/L (ref 0–0.4)
IMM GRANULOCYTES NFR BLD: 1.6 %
LYMPHOCYTES # BLD AUTO: 2.1 10E9/L (ref 0.8–5.3)
LYMPHOCYTES NFR BLD AUTO: 40.7 %
MCH RBC QN AUTO: 29.8 PG (ref 26.5–33)
MCHC RBC AUTO-ENTMCNC: 32.6 G/DL (ref 31.5–36.5)
MCV RBC AUTO: 91 FL (ref 78–100)
MONOCYTES # BLD AUTO: 0.2 10E9/L (ref 0–1.3)
MONOCYTES NFR BLD AUTO: 3.9 %
NEUTROPHILS # BLD AUTO: 2.7 10E9/L (ref 1.6–8.3)
NEUTROPHILS NFR BLD AUTO: 53.6 %
NRBC # BLD AUTO: 0 10*3/UL
NRBC BLD AUTO-RTO: 0 /100
OPIATES UR QL SCN: NEGATIVE
PLATELET # BLD AUTO: 350 10E9/L (ref 150–450)
POTASSIUM SERPL-SCNC: 4.1 MMOL/L (ref 3.4–5.3)
PROT SERPL-MCNC: 7 G/DL (ref 6.8–8.8)
RBC # BLD AUTO: 4.43 10E12/L (ref 3.8–5.2)
SALICYLATES SERPL-MCNC: <2 MG/DL
SARS-COV-2 RNA SPEC QL NAA+PROBE: NORMAL
SODIUM SERPL-SCNC: 147 MMOL/L (ref 133–144)
SPECIMEN SOURCE: NORMAL
T4 FREE SERPL-MCNC: 1.08 NG/DL (ref 0.76–1.46)
TSH SERPL DL<=0.005 MIU/L-ACNC: 0.15 MU/L (ref 0.4–4)
WBC # BLD AUTO: 5.1 10E9/L (ref 4–11)

## 2020-08-22 PROCEDURE — 80329 ANALGESICS NON-OPIOID 1 OR 2: CPT | Performed by: EMERGENCY MEDICINE

## 2020-08-22 PROCEDURE — 99285 EMERGENCY DEPT VISIT HI MDM: CPT | Mod: Z6 | Performed by: EMERGENCY MEDICINE

## 2020-08-22 PROCEDURE — 80053 COMPREHEN METABOLIC PANEL: CPT | Performed by: EMERGENCY MEDICINE

## 2020-08-22 PROCEDURE — C9803 HOPD COVID-19 SPEC COLLECT: HCPCS

## 2020-08-22 PROCEDURE — 25000128 H RX IP 250 OP 636: Performed by: PSYCHIATRY & NEUROLOGY

## 2020-08-22 PROCEDURE — 84443 ASSAY THYROID STIM HORMONE: CPT | Performed by: EMERGENCY MEDICINE

## 2020-08-22 PROCEDURE — 12800008 ZZH R&B CD ADULT

## 2020-08-22 PROCEDURE — 85025 COMPLETE CBC W/AUTO DIFF WBC: CPT | Performed by: EMERGENCY MEDICINE

## 2020-08-22 PROCEDURE — 25000132 ZZH RX MED GY IP 250 OP 250 PS 637: Mod: GY | Performed by: PSYCHIATRY & NEUROLOGY

## 2020-08-22 PROCEDURE — 82977 ASSAY OF GGT: CPT | Performed by: EMERGENCY MEDICINE

## 2020-08-22 PROCEDURE — 80320 DRUG SCREEN QUANTALCOHOLS: CPT | Performed by: EMERGENCY MEDICINE

## 2020-08-22 PROCEDURE — 80307 DRUG TEST PRSMV CHEM ANLYZR: CPT | Performed by: EMERGENCY MEDICINE

## 2020-08-22 PROCEDURE — 84439 ASSAY OF FREE THYROXINE: CPT | Performed by: EMERGENCY MEDICINE

## 2020-08-22 PROCEDURE — 82607 VITAMIN B-12: CPT | Performed by: EMERGENCY MEDICINE

## 2020-08-22 PROCEDURE — 25000132 ZZH RX MED GY IP 250 OP 250 PS 637: Mod: GY | Performed by: EMERGENCY MEDICINE

## 2020-08-22 PROCEDURE — 99285 EMERGENCY DEPT VISIT HI MDM: CPT | Mod: 25

## 2020-08-22 RX ORDER — HYDROXYZINE HYDROCHLORIDE 25 MG/1
25 TABLET, FILM COATED ORAL EVERY 4 HOURS PRN
Status: DISCONTINUED | OUTPATIENT
Start: 2020-08-22 | End: 2020-08-23 | Stop reason: HOSPADM

## 2020-08-22 RX ORDER — OLANZAPINE 10 MG/2ML
10 INJECTION, POWDER, FOR SOLUTION INTRAMUSCULAR
Status: DISCONTINUED | OUTPATIENT
Start: 2020-08-22 | End: 2020-08-23 | Stop reason: HOSPADM

## 2020-08-22 RX ORDER — BISACODYL 10 MG
10 SUPPOSITORY, RECTAL RECTAL DAILY PRN
Status: DISCONTINUED | OUTPATIENT
Start: 2020-08-22 | End: 2020-08-23 | Stop reason: HOSPADM

## 2020-08-22 RX ORDER — OLANZAPINE 10 MG/1
10 TABLET ORAL
Status: DISCONTINUED | OUTPATIENT
Start: 2020-08-22 | End: 2020-08-23 | Stop reason: HOSPADM

## 2020-08-22 RX ORDER — ACETAMINOPHEN 325 MG/1
650 TABLET ORAL EVERY 4 HOURS PRN
Status: DISCONTINUED | OUTPATIENT
Start: 2020-08-22 | End: 2020-08-23 | Stop reason: HOSPADM

## 2020-08-22 RX ORDER — LANOLIN ALCOHOL/MO/W.PET/CERES
100 CREAM (GRAM) TOPICAL DAILY
Status: DISCONTINUED | OUTPATIENT
Start: 2020-08-23 | End: 2020-08-23 | Stop reason: HOSPADM

## 2020-08-22 RX ORDER — FOLIC ACID 1 MG/1
1 TABLET ORAL DAILY
Status: DISCONTINUED | OUTPATIENT
Start: 2020-08-23 | End: 2020-08-22

## 2020-08-22 RX ORDER — ALUMINA, MAGNESIA, AND SIMETHICONE 2400; 2400; 240 MG/30ML; MG/30ML; MG/30ML
30 SUSPENSION ORAL EVERY 4 HOURS PRN
Status: DISCONTINUED | OUTPATIENT
Start: 2020-08-22 | End: 2020-08-23 | Stop reason: HOSPADM

## 2020-08-22 RX ORDER — ATENOLOL 50 MG/1
50 TABLET ORAL DAILY PRN
Status: DISCONTINUED | OUTPATIENT
Start: 2020-08-22 | End: 2020-08-23 | Stop reason: HOSPADM

## 2020-08-22 RX ORDER — ONDANSETRON 4 MG/1
4 TABLET, ORALLY DISINTEGRATING ORAL EVERY 6 HOURS PRN
Status: DISCONTINUED | OUTPATIENT
Start: 2020-08-22 | End: 2020-08-23 | Stop reason: HOSPADM

## 2020-08-22 RX ORDER — TRAZODONE HYDROCHLORIDE 50 MG/1
50 TABLET, FILM COATED ORAL
Status: DISCONTINUED | OUTPATIENT
Start: 2020-08-22 | End: 2020-08-23 | Stop reason: HOSPADM

## 2020-08-22 RX ORDER — LANOLIN ALCOHOL/MO/W.PET/CERES
100 CREAM (GRAM) TOPICAL ONCE
Status: COMPLETED | OUTPATIENT
Start: 2020-08-22 | End: 2020-08-22

## 2020-08-22 RX ORDER — ONDANSETRON 4 MG/1
4 TABLET, ORALLY DISINTEGRATING ORAL EVERY 8 HOURS PRN
COMMUNITY

## 2020-08-22 RX ORDER — MULTIPLE VITAMINS W/ MINERALS TAB 9MG-400MCG
1 TAB ORAL ONCE
Status: COMPLETED | OUTPATIENT
Start: 2020-08-22 | End: 2020-08-22

## 2020-08-22 RX ORDER — SUMATRIPTAN 50 MG/1
100 TABLET, FILM COATED ORAL
Status: DISCONTINUED | OUTPATIENT
Start: 2020-08-22 | End: 2020-08-23 | Stop reason: HOSPADM

## 2020-08-22 RX ORDER — LOPERAMIDE HCL 2 MG
2 CAPSULE ORAL 4 TIMES DAILY PRN
Status: DISCONTINUED | OUTPATIENT
Start: 2020-08-22 | End: 2020-08-23 | Stop reason: HOSPADM

## 2020-08-22 RX ORDER — SUMATRIPTAN 100 MG/1
100 TABLET, FILM COATED ORAL
COMMUNITY

## 2020-08-22 RX ORDER — DIAZEPAM 5 MG
5-20 TABLET ORAL EVERY 30 MIN PRN
Status: DISCONTINUED | OUTPATIENT
Start: 2020-08-22 | End: 2020-08-23 | Stop reason: HOSPADM

## 2020-08-22 RX ORDER — DIAZEPAM 5 MG
5 TABLET ORAL 2 TIMES DAILY PRN
Status: ON HOLD | COMMUNITY
End: 2020-08-23

## 2020-08-22 RX ORDER — MULTIPLE VITAMINS W/ MINERALS TAB 9MG-400MCG
1 TAB ORAL DAILY
Status: DISCONTINUED | OUTPATIENT
Start: 2020-08-23 | End: 2020-08-23 | Stop reason: HOSPADM

## 2020-08-22 RX ORDER — FOLIC ACID 1 MG/1
1 TABLET ORAL DAILY
Status: DISCONTINUED | OUTPATIENT
Start: 2020-08-22 | End: 2020-08-23 | Stop reason: HOSPADM

## 2020-08-22 RX ORDER — PREDNISONE 1 MG/1
7 TABLET ORAL DAILY
COMMUNITY

## 2020-08-22 RX ORDER — ZOLPIDEM TARTRATE 12.5 MG/1
12.5 TABLET, FILM COATED, EXTENDED RELEASE ORAL
COMMUNITY

## 2020-08-22 RX ADMIN — MULTIPLE VITAMINS W/ MINERALS TAB 1 TABLET: TAB at 19:27

## 2020-08-22 RX ADMIN — ONDANSETRON 4 MG: 4 TABLET, ORALLY DISINTEGRATING ORAL at 23:26

## 2020-08-22 RX ADMIN — THIAMINE HCL TAB 100 MG 100 MG: 100 TAB at 19:27

## 2020-08-22 RX ADMIN — DIAZEPAM 10 MG: 5 TABLET ORAL at 23:26

## 2020-08-22 RX ADMIN — FOLIC ACID 1 MG: 1 TABLET ORAL at 19:27

## 2020-08-22 ASSESSMENT — ENCOUNTER SYMPTOMS
NAUSEA: 0
NECK STIFFNESS: 0
FREQUENCY: 0
PHOTOPHOBIA: 0
ABDOMINAL PAIN: 0
CONSTIPATION: 0
BACK PAIN: 0
DYSURIA: 0
DIARRHEA: 0
WHEEZING: 0
HEADACHES: 0
SHORTNESS OF BREATH: 0
NUMBNESS: 0
WEAKNESS: 0
VOMITING: 0
NECK PAIN: 0

## 2020-08-22 ASSESSMENT — ACTIVITIES OF DAILY LIVING (ADL)
COGNITION: 0 - NO COGNITION ISSUES REPORTED
RETIRED_EATING: 0-->INDEPENDENT
TRANSFERRING: 0-->INDEPENDENT
TOILETING: 0-->INDEPENDENT
PRIOR_FUNCTIONAL_LEVEL_COMMENT: INDEPENDENT
BATHING: 0-->INDEPENDENT
FALL_HISTORY_WITHIN_LAST_SIX_MONTHS: NO
RETIRED_COMMUNICATION: 0-->UNDERSTANDS/COMMUNICATES WITHOUT DIFFICULTY
SWALLOWING: 0-->SWALLOWS FOODS/LIQUIDS WITHOUT DIFFICULTY
DRESS: 0-->INDEPENDENT
AMBULATION: 0-->INDEPENDENT
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
## 2020-08-23 VITALS
DIASTOLIC BLOOD PRESSURE: 106 MMHG | SYSTOLIC BLOOD PRESSURE: 164 MMHG | TEMPERATURE: 98 F | RESPIRATION RATE: 16 BRPM | HEART RATE: 74 BPM | OXYGEN SATURATION: 99 %

## 2020-08-23 LAB
DEPRECATED CALCIDIOL+CALCIFEROL SERPL-MC: 33 UG/L (ref 20–75)
LABORATORY COMMENT REPORT: NORMAL
SARS-COV-2 RNA SPEC QL NAA+PROBE: NEGATIVE
SPECIMEN SOURCE: NORMAL
VIT B12 SERPL-MCNC: >6000 PG/ML (ref 193–986)

## 2020-08-23 PROCEDURE — 82306 VITAMIN D 25 HYDROXY: CPT | Performed by: PSYCHIATRY & NEUROLOGY

## 2020-08-23 PROCEDURE — 36415 COLL VENOUS BLD VENIPUNCTURE: CPT | Performed by: PSYCHIATRY & NEUROLOGY

## 2020-08-23 PROCEDURE — 99221 1ST HOSP IP/OBS SF/LOW 40: CPT | Mod: 95 | Performed by: PSYCHIATRY & NEUROLOGY

## 2020-08-23 PROCEDURE — 99207 ZZC DOWN CODE DUE TO INITIAL EXAM: CPT | Performed by: PSYCHIATRY & NEUROLOGY

## 2020-08-23 PROCEDURE — HZ2ZZZZ DETOXIFICATION SERVICES FOR SUBSTANCE ABUSE TREATMENT: ICD-10-PCS | Performed by: PSYCHIATRY & NEUROLOGY

## 2020-08-23 PROCEDURE — 25000132 ZZH RX MED GY IP 250 OP 250 PS 637: Mod: GY | Performed by: PSYCHIATRY & NEUROLOGY

## 2020-08-23 RX ORDER — LANOLIN ALCOHOL/MO/W.PET/CERES
100 CREAM (GRAM) TOPICAL DAILY
Qty: 90 TABLET | Refills: 1 | Status: SHIPPED | OUTPATIENT
Start: 2020-08-24

## 2020-08-23 RX ORDER — CLONIDINE HYDROCHLORIDE 0.1 MG/1
0.1 TABLET ORAL 2 TIMES DAILY PRN
Status: DISCONTINUED | OUTPATIENT
Start: 2020-08-23 | End: 2020-08-23 | Stop reason: HOSPADM

## 2020-08-23 RX ORDER — FOLIC ACID 1 MG/1
1 TABLET ORAL DAILY
Qty: 90 TABLET | Refills: 1 | Status: SHIPPED | OUTPATIENT
Start: 2020-08-24

## 2020-08-23 RX ORDER — GABAPENTIN 100 MG/1
100 CAPSULE ORAL 3 TIMES DAILY PRN
Status: DISCONTINUED | OUTPATIENT
Start: 2020-08-23 | End: 2020-08-23 | Stop reason: HOSPADM

## 2020-08-23 RX ORDER — MULTIPLE VITAMINS W/ MINERALS TAB 9MG-400MCG
1 TAB ORAL DAILY
Qty: 90 EACH | Refills: 1 | Status: SHIPPED | OUTPATIENT
Start: 2020-08-24

## 2020-08-23 RX ORDER — KETOROLAC TROMETHAMINE 10 MG/1
10 TABLET, FILM COATED ORAL EVERY 4 HOURS PRN
Status: DISCONTINUED | OUTPATIENT
Start: 2020-08-23 | End: 2020-08-23 | Stop reason: HOSPADM

## 2020-08-23 RX ADMIN — DIAZEPAM 10 MG: 5 TABLET ORAL at 01:05

## 2020-08-23 RX ADMIN — ACETAMINOPHEN 650 MG: 325 TABLET ORAL at 01:42

## 2020-08-23 RX ADMIN — KETOROLAC TROMETHAMINE 10 MG: 10 TABLET, FILM COATED ORAL at 03:57

## 2020-08-23 ASSESSMENT — ENCOUNTER SYMPTOMS
NERVOUS/ANXIOUS: 1
SLEEP DISTURBANCE: 1
DYSPHORIC MOOD: 1

## 2020-08-23 ASSESSMENT — ACTIVITIES OF DAILY LIVING (ADL)
ORAL_HYGIENE: INDEPENDENT
LAUNDRY: UNABLE TO COMPLETE
DRESS: STREET CLOTHES
HYGIENE/GROOMING: INDEPENDENT

## 2020-08-23 NOTE — H&P
H&P dictated.  Discharge completed as well    Video-Visit Details    Type of service:  Video Visit    Video Start Time (time video started): 0930    Video End Time (time video stopped): 1000    Originating Location (pt. Location): [facility]    Distant Location (provider location): Provider remote location    Mode of Communication:  Video Conference via [employer]    Physician has received verbal consent for a Video Visit from the patient? Yes      Jarret Xavier MD

## 2020-08-23 NOTE — PROGRESS NOTES
"S:  Keyla requested to speak with this writer complaining that she had been given invalid information while she was downstairs in the ED.  In an excited and angry manner, raising her voice as she spoke.  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 She stated her 3A nurse had informed her that she couldn't leave for 72 hours.  \"That little nurse told me I'm unable to leave,  well I was told that I wouldn't be in a locked unit and that I would have a private room.\"  Keyla had signed a 72 Hour Intent to Leave document after she had come out of the Intake Interview.    Writer explained that when she arrived on the floor she had signed in voluntarily under certain circumstances and started to tell her what they were.  She said, \"Let me see the document\".  Writer handed it to her and she ripped it into about eight different pieces and dropped it in the garbage receptacle.  Writer retrieved the pieces and explained that she could not leave until the doctor had examined her.  She was very upset and writer offered to have the Administrative Nursing Supervisor discuss things with her.    ANS, Christine was contacted and she agreed to come and speak with Keyla, who had calmed down and was sitting quietly in the dining room.  She explained that she had RA and was immunocompromised and did not want to be on the unit because of the increase risk of jerry COVID.  A few minutes later Christine arrived and sat down to discuss the situation with her.  "

## 2020-08-23 NOTE — PROVIDER NOTIFICATION
Pt continues to c/o neck and joint pain after administration of Tylenol and warm pack. Dr Xavier contacted and Gabepentin and Toradol are ordered prn for pain management.

## 2020-08-23 NOTE — PROGRESS NOTES
08/22/20 2112   Patient Belongings   Did you bring any home meds/supplements to the hospital?  No   Patient Belongings other (see comments)   Belongings Search Yes   Clothing Search Yes   Second Staff [employer]     STORAGE BIN:   Purse, 2 wallets, pants, earbuds    MED ROOM BIN:  Cell phone    SECURITY:   3 visa, 2 id, MC, ins, $4.00, target  A             Admission:  I am responsible for any personal items that are not sent to the safe or pharmacy.  [facility] is not responsible for loss, theft or damage of any property in my possession.    Signature:  _________________________________ Date: _______  Time: _____                                              Staff Signature:  ____________________________ Date: ________  Time: _____      2nd Staff person, if patient is unable/unwilling to sign:    Signature: ________________________________ Date: ________  Time: _____   Discharge:  [facility] has returned all of my personal belongings:    Signature: _________________________________ Date: ________  Time: _____                                          Staff Signature:  ____________________________ Date: ________  Time: _____

## 2020-08-23 NOTE — PROGRESS NOTES
"Patient came to the desk to ask for medications for sleep. Patient has not been sleeping since the start of the shift. Trazodone offered but patient declined because she is allergic to the medication. Hydroxyzine offered but again patient declined. The last prn medication which the writer offered was Zyprexa but patient again declined. Patient stated that the reason why she is not sleeping because she is in pain related to RA and she wants to have her PTA meds. Writer also offered Tylenol but patient declined. She said \"it's nothing. It's just water to me\". She also wanted to have her Ambien. She said that she has been taking Ambien for 18 years and that \"there's nothing that can help me sleep except the Ambien\". Vital signs were taken BP-134/88; P-83; T-97.8. MSSA score was 9. Valium 10 mgs given. Patient went back to her room.    1:42 a.m Tylenol was offered for pain and the patient finally took it.  "

## 2020-08-23 NOTE — PROGRESS NOTES
"Patient complained that \"I think I develop bradycardia after the Toradol\". Writer checked patient's vital signs. /95; P- 83. Patient was relieved and went back to her room. Writer told the patient to wait for about an hour before the medication would kick in. Patient tried to go back to sleep.  "
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
## 2020-08-23 NOTE — PHARMACY-ADMISSION MEDICATION HISTORY
Admission Medication History Completed by Pharmacy    See [facility] Admission Navigator for allergy information, preferred outpatient pharmacy, prior to admission medications and immunization status.     Medication History Sources:     Patient, Dispense report, Care Everywhere    Changes made to PTA medication list (reason):    Added:   o Sumatriptan 100 mg tablet per Dispense Report and Care Everywhere for instructions  o Zolpidem Tartrate ER 12.5 mg tablet per Dispense Report and Care Everywhere for instructions  o Diazepam 5 mg tablet per Dispense Report and Care Everywhere for instructions  o Prednisone 1 mg tablet per Dispense Report and Care Everywhere for instructions    Deleted:   o Erythromycin (ROMYCIN) 5 mg/gm ophthalmic ointment: apply small amount to incision site(s) 3x per day, as directed per physician instructions.  o Folic Acid (FOLVITE) 1 mg tablet: take 1 mg by mouth daily  o Neomycin-polymixin-dexamethasone (MAXITROL) 0.1% ophthalmic suspension: apply 1 drop to eye 4 times daily. Instill into the operative eye(s) as directed per physician instructions.    o Polyethylene glycol (MIRALAX/GYCOLAX) powder: take 1 capful by mouth daily    Changed: None    Additional Information:    Patient was a good historian.     Prior to Admission medications    Medication Sig Last Dose Taking? Auth Provider   diazepam (VALIUM) 5 MG tablet Take 5 mg by mouth 2 times daily as needed for anxiety 8/21/2020 at Unknown time Yes Unknown, Entered By History   ondansetron (ZOFRAN-ODT) 4 MG ODT tab Take 4 mg by mouth every 8 hours as needed for nausea or vomiting 8/21/2020 at Unknown time Yes Unknown, Entered By History   predniSONE (DELTASONE) 1 MG tablet Take 7 mg by mouth daily 8/22/2020 at Unknown time Yes Unknown, Entered By History   SUMAtriptan (IMITREX) 100 MG tablet Take 100 mg by mouth at onset of headache for migraine No more than 200 mg per 24 hours. 8/22/2020 at Unknown time Yes Unknown, Entered By History    zolpidem ER (AMBIEN CR) 12.5 MG CR tablet Take 12.5 mg by mouth nightly as needed for sleep 8/21/2020 at Unknown time Yes Unknown, Entered By History           Date completed: 08/22/20    Medication history completed by: Dorothea Rosen
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
## 2020-08-23 NOTE — PLAN OF CARE
Patient discharged to home against medical advise signing 72 hour intent to leave.  Patient verbalized understanding of the discharge instructions.  Medications and belongings sent with patient upon discharge.

## 2020-08-23 NOTE — PROGRESS NOTES
"Patient approached this writer and apologized. She said \"I don't mean to be mean to you or any other people. I was just frustrated that I couldn't take my Ambien\".  "

## 2020-08-23 NOTE — PROGRESS NOTES
"Patient's MSSA is 9. Patient refused to take Valium. She said that the reason why she is not sleeping because she has not taken Ambien. She said \"I've been taking Ambien for 18 years and now it is stopped!\". Patient came out from her room and paced in the hallway.  "

## 2020-12-23 ENCOUNTER — HOSPITAL ENCOUNTER (EMERGENCY)
Facility: CLINIC | Age: 50
Discharge: HOME | End: 2020-12-23
Attending: EMERGENCY MEDICINE | Admitting: EMERGENCY MEDICINE
Payer: MEDICARE

## 2020-12-23 VITALS
DIASTOLIC BLOOD PRESSURE: 77 MMHG | OXYGEN SATURATION: 100 % | HEART RATE: 69 BPM | WEIGHT: 118 LBS | TEMPERATURE: 97 F | BODY MASS INDEX: 19.05 KG/M2 | RESPIRATION RATE: 14 BRPM | SYSTOLIC BLOOD PRESSURE: 108 MMHG

## 2020-12-23 DIAGNOSIS — G44.209 TENSION HEADACHE: ICD-10-CM

## 2020-12-23 DIAGNOSIS — T14.8XXA BRUISING: ICD-10-CM

## 2020-12-23 DIAGNOSIS — M45.9 ANKYLOSING SPONDYLITIS, UNSPECIFIED SITE OF SPINE (H): ICD-10-CM

## 2020-12-23 LAB
ALBUMIN SERPL-MCNC: 3.6 G/DL (ref 3.4–5)
ALP SERPL-CCNC: 50 U/L (ref 40–150)
ALT SERPL W P-5'-P-CCNC: 39 U/L (ref 0–50)
ANION GAP SERPL CALCULATED.3IONS-SCNC: 5 MMOL/L (ref 3–14)
AST SERPL W P-5'-P-CCNC: 48 U/L (ref 0–45)
BASOPHILS # BLD AUTO: 0 10E9/L (ref 0–0.2)
BASOPHILS NFR BLD AUTO: 0.4 %
BILIRUB SERPL-MCNC: 0.3 MG/DL (ref 0.2–1.3)
BUN SERPL-MCNC: 16 MG/DL (ref 7–30)
CALCIUM SERPL-MCNC: 7.9 MG/DL (ref 8.5–10.1)
CHLORIDE SERPL-SCNC: 106 MMOL/L (ref 94–109)
CO2 SERPL-SCNC: 30 MMOL/L (ref 20–32)
CREAT SERPL-MCNC: 0.92 MG/DL (ref 0.52–1.04)
CRP SERPL-MCNC: <2.9 MG/L (ref 0–8)
DIFFERENTIAL METHOD BLD: ABNORMAL
EOSINOPHIL # BLD AUTO: 0.1 10E9/L (ref 0–0.7)
EOSINOPHIL NFR BLD AUTO: 1.1 %
ERYTHROCYTE [DISTWIDTH] IN BLOOD BY AUTOMATED COUNT: 14.4 % (ref 10–15)
ERYTHROCYTE [SEDIMENTATION RATE] IN BLOOD BY WESTERGREN METHOD: 5 MM/H (ref 0–20)
GFR SERPL CREATININE-BSD FRML MDRD: 73 ML/MIN/{1.73_M2}
GLUCOSE SERPL-MCNC: 95 MG/DL (ref 70–99)
HCT VFR BLD AUTO: 40.1 % (ref 35–47)
HGB BLD-MCNC: 12.6 G/DL (ref 11.7–15.7)
IMM GRANULOCYTES # BLD: 0 10E9/L (ref 0–0.4)
IMM GRANULOCYTES NFR BLD: 0.3 %
LYMPHOCYTES # BLD AUTO: 4.8 10E9/L (ref 0.8–5.3)
LYMPHOCYTES NFR BLD AUTO: 46.8 %
MCH RBC QN AUTO: 28.5 PG (ref 26.5–33)
MCHC RBC AUTO-ENTMCNC: 31.4 G/DL (ref 31.5–36.5)
MCV RBC AUTO: 91 FL (ref 78–100)
MONOCYTES # BLD AUTO: 0.6 10E9/L (ref 0–1.3)
MONOCYTES NFR BLD AUTO: 5.5 %
NEUTROPHILS # BLD AUTO: 4.7 10E9/L (ref 1.6–8.3)
NEUTROPHILS NFR BLD AUTO: 45.9 %
NRBC # BLD AUTO: 0 10*3/UL
NRBC BLD AUTO-RTO: 0 /100
PLATELET # BLD AUTO: 290 10E9/L (ref 150–450)
POTASSIUM SERPL-SCNC: 3.8 MMOL/L (ref 3.4–5.3)
PROT SERPL-MCNC: 6.3 G/DL (ref 6.8–8.8)
RBC # BLD AUTO: 4.42 10E12/L (ref 3.8–5.2)
SODIUM SERPL-SCNC: 141 MMOL/L (ref 133–144)
TSH SERPL DL<=0.005 MIU/L-ACNC: 1.39 MU/L (ref 0.4–4)
WBC # BLD AUTO: 10.2 10E9/L (ref 4–11)

## 2020-12-23 PROCEDURE — 250N000011 HC RX IP 250 OP 636: Performed by: EMERGENCY MEDICINE

## 2020-12-23 PROCEDURE — 258N000003 HC RX IP 258 OP 636: Performed by: EMERGENCY MEDICINE

## 2020-12-23 PROCEDURE — 99284 EMERGENCY DEPT VISIT MOD MDM: CPT | Mod: 25 | Performed by: EMERGENCY MEDICINE

## 2020-12-23 PROCEDURE — 85652 RBC SED RATE AUTOMATED: CPT | Performed by: EMERGENCY MEDICINE

## 2020-12-23 PROCEDURE — 96375 TX/PRO/DX INJ NEW DRUG ADDON: CPT | Performed by: EMERGENCY MEDICINE

## 2020-12-23 PROCEDURE — 96374 THER/PROPH/DIAG INJ IV PUSH: CPT | Performed by: EMERGENCY MEDICINE

## 2020-12-23 PROCEDURE — 85025 COMPLETE CBC W/AUTO DIFF WBC: CPT | Performed by: EMERGENCY MEDICINE

## 2020-12-23 PROCEDURE — 96361 HYDRATE IV INFUSION ADD-ON: CPT | Performed by: EMERGENCY MEDICINE

## 2020-12-23 PROCEDURE — 86140 C-REACTIVE PROTEIN: CPT | Performed by: EMERGENCY MEDICINE

## 2020-12-23 PROCEDURE — 84443 ASSAY THYROID STIM HORMONE: CPT | Performed by: EMERGENCY MEDICINE

## 2020-12-23 PROCEDURE — 80053 COMPREHEN METABOLIC PANEL: CPT | Performed by: EMERGENCY MEDICINE

## 2020-12-23 RX ORDER — ONDANSETRON 2 MG/ML
8 INJECTION INTRAMUSCULAR; INTRAVENOUS ONCE
Status: COMPLETED | OUTPATIENT
Start: 2020-12-23 | End: 2020-12-23

## 2020-12-23 RX ORDER — KETOROLAC TROMETHAMINE 15 MG/ML
15 INJECTION, SOLUTION INTRAMUSCULAR; INTRAVENOUS ONCE
Status: COMPLETED | OUTPATIENT
Start: 2020-12-23 | End: 2020-12-23

## 2020-12-23 RX ADMIN — ONDANSETRON 8 MG: 2 INJECTION INTRAMUSCULAR; INTRAVENOUS at 00:53

## 2020-12-23 RX ADMIN — SODIUM CHLORIDE, POTASSIUM CHLORIDE, SODIUM LACTATE AND CALCIUM CHLORIDE 1000 ML: 600; 310; 30; 20 INJECTION, SOLUTION INTRAVENOUS at 00:52

## 2020-12-23 RX ADMIN — KETOROLAC TROMETHAMINE 15 MG: 15 INJECTION, SOLUTION INTRAMUSCULAR; INTRAVENOUS at 00:52

## 2020-12-23 ASSESSMENT — ENCOUNTER SYMPTOMS
WEAKNESS: 0
NECK STIFFNESS: 0
FEVER: 0
NAUSEA: 0
NUMBNESS: 0
ABDOMINAL PAIN: 0
FATIGUE: 1
MYALGIAS: 1
VOMITING: 0
DYSURIA: 0
BACK PAIN: 1
BRUISES/BLEEDS EASILY: 1
COUGH: 0
SHORTNESS OF BREATH: 0
CHEST TIGHTNESS: 0
APPETITE CHANGE: 0
NECK PAIN: 0
CHILLS: 0
WOUND: 0
LIGHT-HEADEDNESS: 0
DIARRHEA: 0
HEADACHES: 1

## 2020-12-23 NOTE — ED NOTES
Pt left cell phone at apartment building and doesn't recall SO phone number or any other friends/family numbers. Pt thought she was calling her mom, but did accidentally call ex-. Pt states he is not going to pick her up. Pt did agree to sign [facility] everywhere to attempt to find phone number for family (pt declined other health systems at [facility] time). Health records gave number for a friend name Blank--pt states she wouldn't know her families numbers. Of note, in meantime patient has dis-gowned and has jacket on. Pt asked to remove coat so that IV could be removed prior to discharge pt was informed that she is not currently up for discharge. Pt was also offered cab ride home to avoid calling SO for a ride. Pt was unable to give registration an address at this time.    Community Health Rx#:[id number]   


 

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
## 2021-01-15 ENCOUNTER — HEALTH MAINTENANCE LETTER (OUTPATIENT)
Age: 51
End: 2021-01-15

## 2021-03-26 ENCOUNTER — HOSPITAL ENCOUNTER (OUTPATIENT)
Dept: MAMMOGRAPHY | Facility: CLINIC | Age: 51
Discharge: HOME | End: 2021-03-26
Attending: FAMILY MEDICINE | Admitting: FAMILY MEDICINE
Payer: MEDICARE

## 2021-03-26 DIAGNOSIS — Z12.31 VISIT FOR SCREENING MAMMOGRAM: ICD-10-CM

## 2021-03-26 PROCEDURE — 77067 SCR MAMMO BI INCL CAD: CPT

## 2021-05-15 ENCOUNTER — HEALTH MAINTENANCE LETTER (OUTPATIENT)
Age: 51
End: 2021-05-15

## 2021-05-19 ENCOUNTER — HOSPITAL ENCOUNTER (EMERGENCY)
Facility: CLINIC | Age: 51
Discharge: HOME | End: 2021-05-20
Attending: FAMILY MEDICINE | Admitting: FAMILY MEDICINE
Payer: MEDICARE

## 2021-05-19 DIAGNOSIS — E86.0 DEHYDRATION: ICD-10-CM

## 2021-05-19 DIAGNOSIS — Z98.890 POSTOPERATIVE STATE: ICD-10-CM

## 2021-05-19 DIAGNOSIS — R11.11 VOMITING WITHOUT NAUSEA, INTRACTABILITY OF VOMITING NOT SPECIFIED, UNSPECIFIED VOMITING TYPE: ICD-10-CM

## 2021-05-19 DIAGNOSIS — R19.7 DIARRHEA, UNSPECIFIED TYPE: ICD-10-CM

## 2021-05-19 LAB
ALBUMIN UR-MCNC: 100 MG/DL
AMPHETAMINES UR QL SCN: NEGATIVE
APPEARANCE UR: CLEAR
BARBITURATES UR QL: NEGATIVE
BENZODIAZ UR QL: POSITIVE
BILIRUB UR QL STRIP: NEGATIVE
CANNABINOIDS UR QL SCN: POSITIVE
COCAINE UR QL: NEGATIVE
COLOR UR AUTO: YELLOW
GLUCOSE UR STRIP-MCNC: NEGATIVE MG/DL
HGB UR QL STRIP: NEGATIVE
KETONES UR STRIP-MCNC: 5 MG/DL
LEUKOCYTE ESTERASE UR QL STRIP: NEGATIVE
MUCOUS THREADS #/AREA URNS LPF: PRESENT /LPF
NITRATE UR QL: NEGATIVE
OPIATES UR QL SCN: NEGATIVE
PCP UR QL SCN: NEGATIVE
PH UR STRIP: 8 PH (ref 5–7)
RBC #/AREA URNS AUTO: 5 /HPF (ref 0–2)
SOURCE: ABNORMAL
SP GR UR STRIP: 1.02 (ref 1–1.03)
SQUAMOUS #/AREA URNS AUTO: 1 /HPF (ref 0–1)
UROBILINOGEN UR STRIP-MCNC: 0 MG/DL (ref 0–2)
WBC #/AREA URNS AUTO: 6 /HPF (ref 0–5)

## 2021-05-19 PROCEDURE — 99284 EMERGENCY DEPT VISIT MOD MDM: CPT | Performed by: FAMILY MEDICINE

## 2021-05-19 PROCEDURE — 99284 EMERGENCY DEPT VISIT MOD MDM: CPT | Mod: 25 | Performed by: FAMILY MEDICINE

## 2021-05-19 PROCEDURE — 80307 DRUG TEST PRSMV CHEM ANLYZR: CPT | Performed by: EMERGENCY MEDICINE

## 2021-05-19 PROCEDURE — 81001 URINALYSIS AUTO W/SCOPE: CPT | Performed by: EMERGENCY MEDICINE

## 2021-05-19 PROCEDURE — 96361 HYDRATE IV INFUSION ADD-ON: CPT | Performed by: FAMILY MEDICINE

## 2021-05-19 PROCEDURE — 96374 THER/PROPH/DIAG INJ IV PUSH: CPT | Performed by: FAMILY MEDICINE

## 2021-05-20 VITALS
HEART RATE: 78 BPM | TEMPERATURE: 99.2 F | OXYGEN SATURATION: 98 % | SYSTOLIC BLOOD PRESSURE: 111 MMHG | RESPIRATION RATE: 18 BRPM | DIASTOLIC BLOOD PRESSURE: 84 MMHG

## 2021-05-20 DIAGNOSIS — R19.7 DIARRHEA, UNSPECIFIED TYPE: ICD-10-CM

## 2021-05-20 LAB
ALBUMIN SERPL-MCNC: 4 G/DL (ref 3.4–5)
ALP SERPL-CCNC: 41 U/L (ref 40–150)
ALT SERPL W P-5'-P-CCNC: 36 U/L (ref 0–50)
ANION GAP SERPL CALCULATED.3IONS-SCNC: 8 MMOL/L (ref 3–14)
AST SERPL W P-5'-P-CCNC: 17 U/L (ref 0–45)
BASOPHILS # BLD AUTO: 0.1 10E9/L (ref 0–0.2)
BASOPHILS NFR BLD AUTO: 0.4 %
BILIRUB SERPL-MCNC: 0.5 MG/DL (ref 0.2–1.3)
BUN SERPL-MCNC: 15 MG/DL (ref 7–30)
C DIFF TOX B STL QL: NEGATIVE
CALCIUM SERPL-MCNC: 8.2 MG/DL (ref 8.5–10.1)
CHLORIDE SERPL-SCNC: 110 MMOL/L (ref 94–109)
CO2 SERPL-SCNC: 24 MMOL/L (ref 20–32)
CREAT SERPL-MCNC: 0.81 MG/DL (ref 0.52–1.04)
DIFFERENTIAL METHOD BLD: ABNORMAL
EOSINOPHIL # BLD AUTO: 0 10E9/L (ref 0–0.7)
EOSINOPHIL NFR BLD AUTO: 0.1 %
ERYTHROCYTE [DISTWIDTH] IN BLOOD BY AUTOMATED COUNT: 13.9 % (ref 10–15)
GFR SERPL CREATININE-BSD FRML MDRD: 84 ML/MIN/{1.73_M2}
GLUCOSE SERPL-MCNC: 92 MG/DL (ref 70–99)
HCT VFR BLD AUTO: 37.7 % (ref 35–47)
HGB BLD-MCNC: 12.1 G/DL (ref 11.7–15.7)
IMM GRANULOCYTES # BLD: 0.1 10E9/L (ref 0–0.4)
IMM GRANULOCYTES NFR BLD: 1.1 %
LIPASE SERPL-CCNC: 112 U/L (ref 73–393)
LYMPHOCYTES # BLD AUTO: 3.8 10E9/L (ref 0.8–5.3)
LYMPHOCYTES NFR BLD AUTO: 30.7 %
MCH RBC QN AUTO: 28.1 PG (ref 26.5–33)
MCHC RBC AUTO-ENTMCNC: 32.1 G/DL (ref 31.5–36.5)
MCV RBC AUTO: 88 FL (ref 78–100)
MONOCYTES # BLD AUTO: 0.6 10E9/L (ref 0–1.3)
MONOCYTES NFR BLD AUTO: 5.1 %
NEUTROPHILS # BLD AUTO: 7.7 10E9/L (ref 1.6–8.3)
NEUTROPHILS NFR BLD AUTO: 62.6 %
NRBC # BLD AUTO: 0 10*3/UL
NRBC BLD AUTO-RTO: 0 /100
PLATELET # BLD AUTO: 416 10E9/L (ref 150–450)
POTASSIUM SERPL-SCNC: 3.7 MMOL/L (ref 3.4–5.3)
PROT SERPL-MCNC: 6.8 G/DL (ref 6.8–8.8)
RBC # BLD AUTO: 4.3 10E12/L (ref 3.8–5.2)
SODIUM SERPL-SCNC: 142 MMOL/L (ref 133–144)
SPECIMEN SOURCE: NORMAL
WBC # BLD AUTO: 12.3 10E9/L (ref 4–11)

## 2021-05-20 PROCEDURE — 83690 ASSAY OF LIPASE: CPT | Performed by: FAMILY MEDICINE

## 2021-05-20 PROCEDURE — 85025 COMPLETE CBC W/AUTO DIFF WBC: CPT | Performed by: EMERGENCY MEDICINE

## 2021-05-20 PROCEDURE — 250N000011 HC RX IP 250 OP 636: Performed by: FAMILY MEDICINE

## 2021-05-20 PROCEDURE — 87493 C DIFF AMPLIFIED PROBE: CPT | Performed by: FAMILY MEDICINE

## 2021-05-20 PROCEDURE — 258N000003 HC RX IP 258 OP 636: Performed by: FAMILY MEDICINE

## 2021-05-20 PROCEDURE — 80053 COMPREHEN METABOLIC PANEL: CPT | Performed by: FAMILY MEDICINE

## 2021-05-20 RX ORDER — ZOLPIDEM TARTRATE 5 MG/1
10 TABLET ORAL
Qty: 2 TABLET | Refills: 0 | Status: SHIPPED | OUTPATIENT
Start: 2021-05-20 | End: 2021-05-20

## 2021-05-20 RX ORDER — SODIUM CHLORIDE, SODIUM LACTATE, POTASSIUM CHLORIDE, CALCIUM CHLORIDE 600; 310; 30; 20 MG/100ML; MG/100ML; MG/100ML; MG/100ML
1000 INJECTION, SOLUTION INTRAVENOUS CONTINUOUS
Status: DISCONTINUED | OUTPATIENT
Start: 2021-05-20 | End: 2021-05-20 | Stop reason: HOSPADM

## 2021-05-20 RX ORDER — ONDANSETRON 4 MG/1
4 TABLET, ORALLY DISINTEGRATING ORAL EVERY 8 HOURS PRN
Qty: 10 TABLET | Refills: 0 | Status: SHIPPED | OUTPATIENT
Start: 2021-05-20 | End: 2021-05-23

## 2021-05-20 RX ORDER — ZOLPIDEM TARTRATE 10 MG/1
10 TABLET ORAL
Status: DISCONTINUED | OUTPATIENT
Start: 2021-05-20 | End: 2021-05-20

## 2021-05-20 RX ORDER — ZOLPIDEM TARTRATE 5 MG/1
10 TABLET ORAL
Qty: 2 TABLET | Refills: 0 | Status: SHIPPED | OUTPATIENT
Start: 2021-05-20

## 2021-05-20 RX ORDER — ONDANSETRON 2 MG/ML
4 INJECTION INTRAMUSCULAR; INTRAVENOUS ONCE
Status: COMPLETED | OUTPATIENT
Start: 2021-05-20 | End: 2021-05-20

## 2021-05-20 RX ORDER — ZOLPIDEM TARTRATE 12.5 MG/1
12.5 TABLET, FILM COATED, EXTENDED RELEASE ORAL
Status: DISCONTINUED | OUTPATIENT
Start: 2021-05-20 | End: 2021-05-20 | Stop reason: CLARIF

## 2021-05-20 RX ADMIN — SODIUM CHLORIDE, POTASSIUM CHLORIDE, SODIUM LACTATE AND CALCIUM CHLORIDE 1000 ML: 600; 310; 30; 20 INJECTION, SOLUTION INTRAVENOUS at 00:33

## 2021-05-20 RX ADMIN — ONDANSETRON 4 MG: 2 INJECTION INTRAMUSCULAR; INTRAVENOUS at 00:42

## 2021-05-20 ASSESSMENT — ENCOUNTER SYMPTOMS
DYSURIA: 0
SORE THROAT: 0
NAUSEA: 1
WHEEZING: 0
HEADACHES: 0
VOMITING: 1
SINUS PRESSURE: 0
ABDOMINAL PAIN: 1
FREQUENCY: 0
BLOOD IN STOOL: 0
DIAPHORESIS: 0
CHILLS: 0
CONSTIPATION: 0
PALPITATIONS: 0
FEVER: 0
DIARRHEA: 1
SHORTNESS OF BREATH: 0
COUGH: 0

## 2021-05-20 NOTE — ED TRIAGE NOTES
Patient has multiple complaints  Has been sober for months   Patient was seen in  [place]  Patient is constantly talking

## 2021-05-20 NOTE — ED NOTES
Pt states that she had IV therapy earlier today with Zofran IV . She states that she was trying to avoid the ED. She  Has a manic manner with communication. She states that she has been on Sebaxone but not for drug use.

## 2021-05-20 NOTE — ED PROVIDER NOTES
History     Chief Complaint   Patient presents with     Post-op Problem     has had multiple medications today from  [facility]    has HX of RA      Nausea, Vomiting, & Diarrhea     HPI   Keyla Dumont is a 51 year old female who presents with a history  of 5/1 right inguinal hernia incarcerated at [facility] -she has previously been treated at [facility].  In the last 3 days she has had nausea vomiting diarrhea.  Difficulty holding down fluids.  No hematemesis.  She notes possible bilious emesis.  She however is passing frequent stools.  No abdominal distention.  Some pain in the inguinal region still.  No dysuria urgency.    Patient has a history in addition to prior alcohol abuse, rheumatoid arthritis and recently on Remicade dosing, bipolar disorder, eating disorder.    Patient is a history of alcohol abuse and relapsed about 4 months ago.  She was treated 14 years ago.  She is sober again.    Previously on Suboxone in the more distant past for chronic pain.  Not on this now.  Was seen earlier today by [facility] and had IV in place for fluid bolus.    History is more difficult due to somewhat tangential.    Allergies:  Allergies   Allergen Reactions     Penicillins Rash     Ceftin      Compazine [Prochlorperazine]      Phenergan [Promethazine]      Reglan [Metoclopramide]        Problem List:    Patient Active Problem List    Diagnosis Date Noted     Alcohol withdrawal syndrome, with unspecified complication (H) 08/22/2020     Priority: Medium     DDD (degenerative disc disease), thoracic 06/24/2019     Priority: Medium     Overview:   sees Dr. Angeles       Fibromyalgia 06/24/2019     Priority: Medium     Arthritis 03/01/2017     Priority: Medium     rheumatoid       Dependent drug abuse (H) 02/03/2017     Priority: Medium     Myofascial pain 10/28/2014     Priority: Medium     Chronic pain 04/27/2014     Priority: Medium     Injury to back 12/26/2012     Priority: Medium      Thoracic back pain 2011     Priority: Medium     Ankylosing spondylitis (H) 2011     Priority: Medium     Rheumatoid arthritis (H) 2011     Priority: Medium     Problem list name updated by automated process. Provider to review    Overview:   seegalindo Carranza, rheumatologist       Eating disorder 2009     Priority: Medium     Alcohol dependence, episodic drinking behavior (H) 2008     Priority: Medium     Atypical bipolar affective disorder (H) 2008     Priority: Medium     Esophageal reflux 2008     Priority: Medium        Past Medical History:    Past Medical History:   Diagnosis Date     Ankylosing spondylitis (H)      Arthritis      Cholecystitis      Chronic pain      Heart murmur      PONV (postoperative nausea and vomiting)      Spinal headache        Past Surgical History:    Past Surgical History:   Procedure Laterality Date     APPENDECTOMY       CHOLECYSTECTOMY       COSMETIC BROWPEXY BILATERAL Bilateral 2019    Procedure: Bilateral Cosmetic Endoscopic Brow Lift;  Surgeon: Nato Bradley MD;  Location: [facility] OR     DISCECTOMY LUMBAR POSTERIOR MICROSCOPIC ONE LEVEL  10/27/2011    Procedure:DISCECTOMY LUMBAR POSTERIOR MICROSCOPIC ONE LEVEL; RIGHT T7-8 TRANSFACET TRANSPEDICULAR MICRODISCECTOMY WITH METRIX II ; Surgeon:FRANCIS CLARK; Location: OR     GYN SURGERY      , hyst and ovaries     REPAIR PTOSIS Right 2019    Procedure: Right Upper Eyelid Ptosis Repair;  Surgeon: Nato Bradley MD;  Location:  OR       Family History:    Family History   Problem Relation Age of Onset     Substance Abuse Mother      Substance Abuse Father      Brain Cancer Sister      Lung Cancer Maternal Grandmother      Lung Cancer Maternal Grandfather      Pancreatic Cancer Paternal Grandmother      Bone Cancer Paternal Grandfather      Glaucoma No family hx of      Macular Degeneration No family hx of        Social History:  Marital Status:    [4]  Social History     Tobacco Use     Smoking status: Never Smoker     Smokeless tobacco: Never Used   Substance Use Topics     Alcohol use: No     Drug use: No        Medications:    folic acid (FOLVITE) 1 MG tablet  multivitamin w/minerals (THERA-VIT-M) tablet  ondansetron (ZOFRAN-ODT) 4 MG ODT tab  predniSONE (DELTASONE) 1 MG tablet  SUMAtriptan (IMITREX) 100 MG tablet  thiamine (B-1) 100 MG tablet  zolpidem ER (AMBIEN CR) 12.5 MG CR tablet          Review of Systems   Constitutional: Negative for chills, diaphoresis and fever.   HENT: Negative for ear pain, sinus pressure and sore throat.    Eyes: Negative for visual disturbance.   Respiratory: Negative for cough, shortness of breath and wheezing.    Cardiovascular: Negative for chest pain and palpitations.   Gastrointestinal: Positive for abdominal pain, diarrhea, nausea and vomiting. Negative for blood in stool and constipation.   Genitourinary: Negative for dysuria, frequency and urgency.   Skin: Negative for rash.   Neurological: Negative for headaches.   All other systems reviewed and are negative.      Physical Exam   BP: (!) 140/99  Pulse: 97  Temp: 99.2  F (37.3  C)  Resp: 18  SpO2: 97 %      Physical Exam  Constitutional:       General: She is in acute distress.   HENT:      Head: Atraumatic.   Eyes:      Conjunctiva/sclera: Conjunctivae normal.   Neck:      Musculoskeletal: Neck supple.   Cardiovascular:      Rate and Rhythm: Normal rate and regular rhythm.      Heart sounds: No murmur.   Pulmonary:      Effort: Pulmonary effort is normal. No respiratory distress.      Breath sounds: No stridor. No wheezing or rhonchi.   Abdominal:      General: Abdomen is flat. Bowel sounds are normal. There is no distension.      Palpations: There is no mass.      Tenderness: There is abdominal tenderness. There is no guarding.   Musculoskeletal:      Right lower leg: No edema.      Left lower leg: No edema.   Skin:     Coloration: Skin is not pale.      Findings:  No rash.   Neurological:      General: No focal deficit present.      Mental Status: She is alert.      Motor: No weakness.       There is mild tenderness to palpation over the right inguinal region incision.  No obvious swelling here.  No defect.  The abdomen itself is with out swelling.  Abdomen is scaphoid.  Normal bowel sounds.  No distention.  No rebound or guarding.    ED Course        Procedures               Critical Care time:  none               Results for orders placed or performed during the hospital encounter of 05/19/21 (from the past 24 hour(s))   Drug abuse screen urine   Result Value Ref Range    Amphetamine Qual Urine Negative NEG^Negative    Barbiturates Qual Urine Negative NEG^Negative    Benzodiazepine Qual Urine Positive (A) NEG^Negative    Cannabinoids Qual Urine Positive (A) NEG^Negative    Cocaine Qual Urine Negative NEG^Negative    Opiates Qualitative Urine Negative NEG^Negative    PCP Qual Urine Negative NEG^Negative   UA reflex to Microscopic   Result Value Ref Range    Color Urine Yellow     Appearance Urine Clear     Glucose Urine Negative NEG^Negative mg/dL    Bilirubin Urine Negative NEG^Negative    Ketones Urine 5 (A) NEG^Negative mg/dL    Specific Gravity Urine 1.017 1.003 - 1.035    Blood Urine Negative NEG^Negative    pH Urine 8.0 (H) 5.0 - 7.0 pH    Protein Albumin Urine 100 (A) NEG^Negative mg/dL    Urobilinogen mg/dL 0.0 0.0 - 2.0 mg/dL    Nitrite Urine Negative NEG^Negative    Leukocyte Esterase Urine Negative NEG^Negative    Source Midstream Urine     RBC Urine 5 (H) 0 - 2 /HPF    WBC Urine 6 (H) 0 - 5 /HPF    Squamous Epithelial /HPF Urine 1 0 - 1 /HPF    Mucous Urine Present (A) NEG^Negative /LPF   Comprehensive metabolic panel   Result Value Ref Range    Sodium 142 133 - 144 mmol/L    Potassium 3.7 3.4 - 5.3 mmol/L    Chloride 110 (H) 94 - 109 mmol/L    Carbon Dioxide 24 20 - 32 mmol/L    Anion Gap 8 3 - 14 mmol/L    Glucose 92 70 - 99 mg/dL    Urea Nitrogen 15 7 - 30  mg/dL    Creatinine 0.81 0.52 - 1.04 mg/dL    GFR Estimate 84 >60 mL/min/[1.73_m2]    GFR Estimate If Black >90 >60 mL/min/[1.73_m2]    Calcium 8.2 (L) 8.5 - 10.1 mg/dL    Bilirubin Total 0.5 0.2 - 1.3 mg/dL    Albumin 4.0 3.4 - 5.0 g/dL    Protein Total 6.8 6.8 - 8.8 g/dL    Alkaline Phosphatase 41 40 - 150 U/L    ALT 36 0 - 50 U/L    AST 17 0 - 45 U/L   Lipase   Result Value Ref Range    Lipase 112 73 - 393 U/L   CBC with platelets differential   Result Value Ref Range    WBC 12.3 (H) 4.0 - 11.0 10e9/L    RBC Count 4.30 3.8 - 5.2 10e12/L    Hemoglobin 12.1 11.7 - 15.7 g/dL    Hematocrit 37.7 35.0 - 47.0 %    MCV 88 78 - 100 fl    MCH 28.1 26.5 - 33.0 pg    MCHC 32.1 31.5 - 36.5 g/dL    RDW 13.9 10.0 - 15.0 %    Platelet Count 416 150 - 450 10e9/L    Diff Method Automated Method     % Neutrophils 62.6 %    % Lymphocytes 30.7 %    % Monocytes 5.1 %    % Eosinophils 0.1 %    % Basophils 0.4 %    % Immature Granulocytes 1.1 %    Nucleated RBCs 0 0 /100    Absolute Neutrophil 7.7 1.6 - 8.3 10e9/L    Absolute Lymphocytes 3.8 0.8 - 5.3 10e9/L    Absolute Monocytes 0.6 0.0 - 1.3 10e9/L    Absolute Eosinophils 0.0 0.0 - 0.7 10e9/L    Absolute Basophils 0.1 0.0 - 0.2 10e9/L    Abs Immature Granulocytes 0.1 0 - 0.4 10e9/L    Absolute Nucleated RBC 0.0        Medications   lactated ringers BOLUS 1,000 mL (has no administration in time range)   lactated ringers infusion (has no administration in time range)   ondansetron (ZOFRAN) injection 4 mg (has no administration in time range)       Assessments & Plan (with Medical Decision Making)       MDM: Keyla Dumont is a 51 year old female who presents with history of recent inguinal hernia repair that was incarcerated on May 1.  Has been having over the last several days nausea vomiting diarrhea.  Frequent diarrhea.  No abdominal bloating.  No findings on exam of repeat obstruction.  Risk for C. difficile especially with Remicade use for history of rheumatoid arthritis.  Would  recommend C. difficile testing.  We will also check electrolytes blood count IV fluids Zofran.  Recommend follow-up with primary provider.    Evaluation is reassuring.  I do recommend that the patient undergo C. difficile testing but she was not able to give a sample here.  We have given her a bottle to bring home to obtain a sample bring back.    She will need to follow-up with her rheumatologist and primary care.  With Remicade use is certainly at risk for other causes of enteritis.  Her abdominal exam at this time is benign and I suspect is unrelated to her recent inguinal hernia repair.  I see no obvious signs of small bowel obstruction at this time.    At the end of the encounter the patient did mention that a boyfriend or ex- had thrown out all of her medications.  This included medications such as her use of both Valium and Ambien at nighttime.  She asked for medications that she could have tonight to help her with sleep as she does not have these and she would contact her provider tomorrow.  I will not combine Valium and Ambien I told her.  She asked if I could give her a dose of the Ambien for sleep tonight.  I have given a single dose for home.  She will need to contact her primary providers for refills.  I have reviewed [place] monitoring program -and she has extensive refills for some time for both Valium and Ambien.  These appear to be from the same practice on [facility].        I have reviewed the nursing notes.    I have reviewed the findings, diagnosis, plan and need for follow up with the patient.       New Prescriptions    No medications on file       Final diagnoses:   Diarrhea, unspecified type - no serious findings. obtain c diff testing. take zofran as needed for nausea, vomiting. obtain c diff testing.   Dehydration   Vomiting without nausea, intractability of vomiting not specified, unspecified vomiting type   Postoperative state       5/19/2021   [facility]  EMERGENCY DEPT     Eb Brennan MD  05/20/21 1140       Eb Brennan MD  05/20/21 1144

## 2021-06-28 ENCOUNTER — HOSPITAL ENCOUNTER (EMERGENCY)
Facility: CLINIC | Age: 51
Discharge: HOME | End: 2021-06-28
Payer: MEDICARE

## 2021-06-28 VITALS
TEMPERATURE: 96.7 F | OXYGEN SATURATION: 97 % | SYSTOLIC BLOOD PRESSURE: 145 MMHG | DIASTOLIC BLOOD PRESSURE: 78 MMHG | RESPIRATION RATE: 12 BRPM | HEART RATE: 122 BPM | BODY MASS INDEX: 18.48 KG/M2 | HEIGHT: 66 IN | WEIGHT: 115 LBS

## 2021-06-28 ASSESSMENT — MIFFLIN-ST. JEOR: SCORE: 1158.39

## 2021-09-04 ENCOUNTER — HEALTH MAINTENANCE LETTER (OUTPATIENT)
Age: 51
End: 2021-09-04

## 2021-11-05 ENCOUNTER — TELEPHONE (OUTPATIENT)
Dept: OPHTHALMOLOGY | Facility: CLINIC | Age: 51
End: 2021-11-05
Payer: MEDICARE

## 2021-11-05 NOTE — TELEPHONE ENCOUNTER
Forwarding this Message to [employer] to review.     Yaz Rincon Communication Facilitator on 11/5/2021 at 9:04 AM

## 2021-11-05 NOTE — TELEPHONE ENCOUNTER
[facility] Call Center    Phone Message    May a detailed message be left on voicemail: yes     Reason for Call: Appointment Intake    Referring Provider Name: Dr. Bradley  Diagnosis and/or Symptoms: Future Procedure  [place] asking if she can do a virtual visit. Please call her today so she can reschedule office visit if needed. Thank you.     Action Taken: Message routed to:  [facility] ([facility]    Travel Screening: Not Applicable

## 2022-06-11 ENCOUNTER — HEALTH MAINTENANCE LETTER (OUTPATIENT)
Age: 52
End: 2022-06-11

## 2022-10-16 ENCOUNTER — HEALTH MAINTENANCE LETTER (OUTPATIENT)
Age: 52
End: 2022-10-16

## 2023-06-17 ENCOUNTER — HEALTH MAINTENANCE LETTER (OUTPATIENT)
Age: 53
End: 2023-06-17

## (undated) DEVICE — PEN MARKING SKIN TYCO DEVON DUAL TIP 31145868

## (undated) DEVICE — BLADE KNIFE SURG 15 371115

## (undated) DEVICE — ESU GROUND PAD ADULT W/CORD E7507

## (undated) DEVICE — ESU NDL COLORADO MICRO 3CM STR N103A

## (undated) DEVICE — SU SILK 4-0 RB-1 30" K871H

## (undated) DEVICE — SU SILK 6-0 G-1DA 18" 780G

## (undated) DEVICE — SOL WATER IRRIG 500ML BOTTLE 2F7113

## (undated) DEVICE — NDL 30GA 0.5" 305106

## (undated) DEVICE — SYR 03ML LL W/O NDL 309657

## (undated) DEVICE — ESU PENCIL W/COATED BLADE E2450H

## (undated) DEVICE — PACK MINOR EYE CUSTOM ASC

## (undated) DEVICE — GLOVE PROTEXIS MICRO 7.5  2D73PM75

## (undated) DEVICE — SU PLAIN 6-0 G-1DA 18" 770G

## (undated) DEVICE — SU PDS II 3-0 FS-1 27" CLR  Z442H

## (undated) DEVICE — LINEN TOWEL PACK X5 5464

## (undated) DEVICE — EYE PREP BETADINE 5% SOLUTION 30ML 0065-0411-30

## (undated) RX ORDER — ACETAMINOPHEN 325 MG/1
TABLET ORAL
Status: DISPENSED
Start: 2019-02-20

## (undated) RX ORDER — PROPOFOL 10 MG/ML
INJECTION, EMULSION INTRAVENOUS
Status: DISPENSED
Start: 2019-02-20

## (undated) RX ORDER — KETAMINE HYDROCHLORIDE 10 MG/ML
INJECTION INTRAMUSCULAR; INTRAVENOUS
Status: DISPENSED
Start: 2019-02-20

## (undated) RX ORDER — ONDANSETRON 2 MG/ML
INJECTION INTRAMUSCULAR; INTRAVENOUS
Status: DISPENSED
Start: 2019-02-20

## (undated) RX ORDER — SCOLOPAMINE TRANSDERMAL SYSTEM 1 MG/1
PATCH, EXTENDED RELEASE TRANSDERMAL
Status: DISPENSED
Start: 2019-02-20

## (undated) RX ORDER — GLYCOPYRROLATE 0.2 MG/ML
INJECTION INTRAMUSCULAR; INTRAVENOUS
Status: DISPENSED
Start: 2019-02-20

## (undated) RX ORDER — LIDOCAINE HYDROCHLORIDE 20 MG/ML
INJECTION, SOLUTION EPIDURAL; INFILTRATION; INTRACAUDAL; PERINEURAL
Status: DISPENSED
Start: 2019-02-20

## (undated) RX ORDER — KETOROLAC TROMETHAMINE 30 MG/ML
INJECTION, SOLUTION INTRAMUSCULAR; INTRAVENOUS
Status: DISPENSED
Start: 2019-02-20

## (undated) RX ORDER — FENTANYL CITRATE 50 UG/ML
INJECTION, SOLUTION INTRAMUSCULAR; INTRAVENOUS
Status: DISPENSED
Start: 2019-02-20